# Patient Record
(demographics unavailable — no encounter records)

---

## 2024-11-08 NOTE — HISTORY OF PRESENT ILLNESS
[Normal] : Normal [Yes] : Patient goes to dentist yearly [de-identified] : well balanced and varied

## 2024-11-08 NOTE — BEGINNING OF VISIT
[Mother] : mother [] :  [[employer] ] : provided by [employer]   [Time Spent: ____ minutes] : Total time spent using  services: [unfilled] minutes. The patient's primary language is not English thus required  services. [TWNoteComboBox1] : [place]

## 2024-11-20 NOTE — DISCUSSION/SUMMARY
[FreeTextEntry1] : Referred to the ER - Patient had 1 wet diaper in 8 hours  Given 1 dose of decadron for croup cough and ibuprofen for fever.  93% O2 saturation on room air with increase work of breathing. Concern for pneumonia and dehydration.

## 2025-02-08 NOTE — HISTORY OF PRESENT ILLNESS
Impression: Vitreous degeneration, bilateral: H43.813. Plan: no holes/tears/detachments. RD precautions discussed. Monitor. 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   Febrile (Tmax: 102) starting Sunday, receiving Motrin/Tylenol, last dose was last night, afebrile today  Cough and Congestion starting Sunday. Cough worse in the morning, No increased WOB or SOB.  Face Rash started yesterday. Unable to state if rash hurts or not. Rash improving. Previous additional red rash on her belly button that resolved today.   3 episodes of NBNB emesis Monday, resolved. Eating and drinking at baseline now. Adequate UOP. No diarrhea or dysuria. No headache, changes in gait, or dizziness. No chest pain. No known sick contacts but attends school. Previously in the hospital 1-2 weeks ago for dehydration 2/2 to nausea/vomiting. Returned to baseline upon discharge home prior to this presentation. UTD on vaccinations.

## 2025-02-08 NOTE — HISTORY OF PRESENT ILLNESS
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   Febrile (Tmax: 102) starting Sunday, receiving Motrin/Tylenol, last dose was last night, afebrile today  Cough and Congestion starting Sunday. Cough worse in the morning, No increased WOB or SOB.  Face Rash started yesterday. Unable to state if rash hurts or not. Rash improving. Previous additional red rash on her belly button that resolved today.   3 episodes of NBNB emesis Monday, resolved. Eating and drinking at baseline now. Adequate UOP. No diarrhea or dysuria. No headache, changes in gait, or dizziness. No chest pain. No known sick contacts but attends school. Previously in the hospital 1-2 weeks ago for dehydration 2/2 to nausea/vomiting. Returned to baseline upon discharge home prior to this presentation. UTD on vaccinations.

## 2025-02-08 NOTE — PHYSICAL EXAM
[Alert] : alert [EOMI] : grossly EOMI [Cerumen in canal] : cerumen in canal [Clear] : right tympanic membrane clear [Clear Rhinorrhea] : clear rhinorrhea [Symmetric Chest Wall] : symmetric chest wall [NL] : soft, nontender, nondistended, normal bowel sounds, no hepatosplenomegaly [Moves All Extremities x 4] : moves all extremities x4 [Acute Distress] : no acute distress [Tenderness] : no tenderness [Conjuctival Injection] : no conjunctival injection [Erythema] : no erythema [Bulging] : not bulging [Purulent Effusion] : no purulent effusion [Erythematous Oropharynx] : nonerythematous oropharynx [FreeTextEntry3] : Fluid behind right TM+ [FreeTextEntry4] : Nasal Congestion+ [de-identified] : -Small red blisters surround L side of mouth

## 2025-02-08 NOTE — DISCUSSION/SUMMARY
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   #URI - Cough and congestion x 4d, fever x3d, resolved.  - Hemodynamically stable, Afebrile. PE notable for cough/congestion and fluid behind right TM otherwise reassuring - Less concern for serious bacterial infection, acute respiratory compromise, or dehydration at this time  - Support Care discussed with mom, including continue good PO, adequate hydration, Motrin/Tylenol for fever/aches - Strict Return Precautions discussed with Mom, including concerns for dehydration, increased work of breathing, and lethargy   #Impetigo -Small red blisters surround L side of mouth  -Mupirocin 3x/day to affected area for 5d course - RTC if rash worsens

## 2025-03-06 NOTE — CONSULT LETTER
[Today's Date] : [unfilled] [Name] : Name: [unfilled] [] : : ~~ [Today's Date:] : [unfilled] [Dear  ___:] : Dear Dr. [unfilled]: [Consult] : I had the pleasure of evaluating your patient, [unfilled]. My full evaluation follows. [Consult - Single Provider] : Thank you very much for allowing me to participate in the care of this patient. If you have any questions, please do not hesitate to contact me. [Sincerely,] : Sincerely, [FreeTextEntry4] : Allison Ruby MD [FreeTextEntry5] :  [address], [place] [address] [de-identified] : Yumi Villegas MD Pediatric Cardiologist [facility], [address], [place] [address] Phone: [phone] FAX: [phone]

## 2025-03-06 NOTE — CARDIOLOGY SUMMARY
[Normal] : normal [LVSF ___%] : LV Shortening Fraction [unfilled]% [Today's Date] : [unfilled] [FreeTextEntry1] : The electrocardiogram today revealed a normal sinus rhythm at a rate of 131 bpm, with a normal axis, a right ventricular conduction delay, and normal ventricular forces.  There were nonspecific T wave abnormalities.  No ectopy was seen on the surface EKG. [FreeTextEntry2] : Summary:  1. 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 2.  {S,D,S  } Situs solitus, D-ventricular looping, normally related great arteries. 3. 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 No hemodynamically  significant VSD seen on this echocardiogram. 4. No evidence of an atrial septal defect and intact atrial septum. 5. Prominent Eustachian valve. 6. 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 7. Mildly dilated aortic root. 8. Aortic sinuses of Valsalva dimension (systole) = 2.3 cm (z = 3.30). 9. Trivial aortic valve regurgitation. 10. The aortic root in cross section (PSAX) measures: 2.13 cm X 2.27 cm X 2.32 cm. The aortic root  leaflets are asymmetric, with a prominent posterior, non-coronary cusp. The sino-tubular junction is effaced. The ascending aorta in cross section (PSAX) at the level of the right pulmonary artery measures: 1.8  cm. The proximal abdominal aorta and the thoracic descending aorta appear normal in caliber and uniform in contour. A ductal ampulla is noted on the proximal descending aorta. 11. No patent ductus arteriosus. 12. Normal ascending aorta. 13. Ascending aorta dimension (systole) = 1.8 cm (z = 1.74). 14. Normal main pulmonary artery confluent with the right and left branch pulmonary arteries. 15. Normal systolic configuration of interventricular septum. 16. There is a high take-off of the right coronary artery (at the sino-tubular junction). It likely arises from the appropriate right sinus of Valsalva; however, imaging of the coronary arteries was technically limited on this study. 17. There is a prominent sub-aortic ventricular septal segment, with no LV outflow obstruction.  18. Normal left ventricular size, morphology and systolic function. 19. Left ventricular ejection fraction by 5/6 Area x Length is normal at 66 %. 20. Normal left ventricular diastolic function. 21. Normal right ventricular morphology with qualitatively normal size and systolic function. 22. No pericardial effusion. [de-identified] : June 18, 2024 [de-identified] : This 24-hour Holter monitor showed a predominant normal sinus rhythm with normal heart rate variation.  The heart rate ranged from 65 to 170 bpm, with an average heart rate of 106 bpm.  There were no long pauses.  Rare premature atrial contractions were seen.  No ventricular ectopy was noted.   [de-identified] : June 18, 2021 [de-identified] : Congenital Disorders of Glycosylation (CDG) genetic panel:  \par  A pathogenic variant and a likely pathogenic variant in gene ALG12. Changes in this gene are associated with autosomal recessive CDG-type 1g.

## 2025-03-06 NOTE — CONSULT LETTER
[Today's Date] : [unfilled] [Name] : Name: [unfilled] [] : : ~~ [Today's Date:] : [unfilled] [Dear  ___:] : Dear Dr. [unfilled]: [Consult] : I had the pleasure of evaluating your patient, [unfilled]. My full evaluation follows. [Consult - Single Provider] : Thank you very much for allowing me to participate in the care of this patient. If you have any questions, please do not hesitate to contact me. [Sincerely,] : Sincerely, [FreeTextEntry4] : Allison Ruby MD [FreeTextEntry5] :  [address], [place] [address] [de-identified] : Yumi Villegas MD Pediatric Cardiologist [facility], [address], [place] [address] Phone: [phone] FAX: [phone]

## 2025-03-06 NOTE — PHYSICAL EXAM
[General Appearance - Alert] : alert [General Appearance - In No Acute Distress] : in no acute distress [General Appearance - Well-Appearing] : well appearing [Cooperative] : cooperative [Sclera] : the conjunctiva were normal [Examination Of The Oral Cavity] : mucous membranes were moist and pink [Respiration, Rhythm And Depth] : normal respiratory rhythm and effort [Auscultation Breath Sounds / Voice Sounds] : breath sounds clear to auscultation bilaterally [No Cough] : no cough [Normal Chest Appearance] : the chest was normal in appearance [Heart Rate And Rhythm] : normal heart rate and rhythm [Heart Sounds] : normal S1 and S2 [No Murmur] : no murmurs  [Arterial Pulses] : normal upper and lower extremity pulses with no pulse delay [Edema] : no edema [Capillary Refill Test] : normal capillary refill [No Diastolic Murmur] : no diastolic murmur was heard [Abdomen Soft] : soft [Nail Clubbing] : no clubbing  or cyanosis of the fingernails [] : no rash [Normal] : no abnormalities [FreeTextEntry1] :  CALS (~3x1cm) -midline abdomen, 1 sacral Welsh patch.

## 2025-03-06 NOTE — CARDIOLOGY SUMMARY
[Normal] : normal [LVSF ___%] : LV Shortening Fraction [unfilled]% [Today's Date] : [unfilled] [FreeTextEntry1] : The electrocardiogram today revealed a normal sinus rhythm at a rate of 131 bpm, with a normal axis, a right ventricular conduction delay, and normal ventricular forces.  There were nonspecific T wave abnormalities.  No ectopy was seen on the surface EKG. [FreeTextEntry2] : Summary:  1. 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 2.  {S,D,S  } Situs solitus, D-ventricular looping, normally related great arteries. 3. 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 No hemodynamically  significant VSD seen on this echocardiogram. 4. No evidence of an atrial septal defect and intact atrial septum. 5. Prominent Eustachian valve. 6. 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 7. Mildly dilated aortic root. 8. Aortic sinuses of Valsalva dimension (systole) = 2.3 cm (z = 3.30). 9. Trivial aortic valve regurgitation. 10. The aortic root in cross section (PSAX) measures: 2.13 cm X 2.27 cm X 2.32 cm. The aortic root  leaflets are asymmetric, with a prominent posterior, non-coronary cusp. The sino-tubular junction is effaced. The ascending aorta in cross section (PSAX) at the level of the right pulmonary artery measures: 1.8  cm. The proximal abdominal aorta and the thoracic descending aorta appear normal in caliber and uniform in contour. A ductal ampulla is noted on the proximal descending aorta. 11. No patent ductus arteriosus. 12. Normal ascending aorta. 13. Ascending aorta dimension (systole) = 1.8 cm (z = 1.74). 14. Normal main pulmonary artery confluent with the right and left branch pulmonary arteries. 15. Normal systolic configuration of interventricular septum. 16. There is a high take-off of the right coronary artery (at the sino-tubular junction). It likely arises from the appropriate right sinus of Valsalva; however, imaging of the coronary arteries was technically limited on this study. 17. There is a prominent sub-aortic ventricular septal segment, with no LV outflow obstruction.  18. Normal left ventricular size, morphology and systolic function. 19. Left ventricular ejection fraction by 5/6 Area x Length is normal at 66 %. 20. Normal left ventricular diastolic function. 21. Normal right ventricular morphology with qualitatively normal size and systolic function. 22. No pericardial effusion. [de-identified] : June 18, 2024 [de-identified] : This 24-hour Holter monitor showed a predominant normal sinus rhythm with normal heart rate variation.  The heart rate ranged from 65 to 170 bpm, with an average heart rate of 106 bpm.  There were no long pauses.  Rare premature atrial contractions were seen.  No ventricular ectopy was noted.   [de-identified] : June 18, 2021 [de-identified] : Congenital Disorders of Glycosylation (CDG) genetic panel:  \par  A pathogenic variant and a likely pathogenic variant in gene ALG12. Changes in this gene are associated with autosomal recessive CDG-type 1g.

## 2025-03-06 NOTE — DISCUSSION/SUMMARY
[May participate in all age-appropriate activities] : [unfilled] May participate in all age-appropriate activities. [Influenza vaccine is recommended] : Influenza vaccine is recommended [FreeTextEntry1] : Lori's cardiac evaluation was notable for a variation on coronary artery anatomy which shows a high takeoff of the right coronary artery from the appropriate right sinus of Valsalva.  Her aortic root and sinotubular junction have been mildly dilated, but stable (see table below).  On today's echocardiogram, her aortic root measured ~ 2.3 cm, consistent with a Z score of ~ 3.3 (a previous aortic root Z score on 2022, was 3.38). Her ascending aortic dimension was WNL.  In the past, she had a perimembranous ventricular septal defect, which has essentially closed by aneurysm formation.  No hemodynamically significant VSD was seen on today's echocardiogram. She has a prominent sub-aortic ventricular segment with no evidence of LV outflow obstruction. She has no clinical or echo evidence of a coarctation of the aorta. She does not have a patent ductus arteriosus. She was in a normal sinus rhythm on her electrocardiogram, with no ectopy.  In , Lori was noted to have frequent, unifocal premature ventricular contractions.  A 24-hour Holter monitor performed 2024, showed no ventricular ectopy.  She shows no signs or symptoms of congestive heart failure.  Her weight gain has been progressive and adequate. In the past, she has been treated for iron deficiency anemia. This should be followed in your pediatric office.  Aortic root dimensions/Z-scores: Tracking her aortic dimensions since  aortic root Z scores have ranged from 1.94 to 3.38.  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  She is a former 34+week premie  female with medical history of congenital heart disease, microcephaly and dysmorphic features (bitemporal narrowing, plagiocephaly, unusual facies, upslanting palpebrae with dystopia canthorum, anteverted nares, mild retrognathia, 5th finger clinodactyly, skin dimples at elbows and knees). Her prenatal genetic testing for thickened nuchal fold included prenatal chromosome and microarray analysis and [place] gene panel were unrevealing. 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 More recently, a genetic Congenital Disorders of Glycosylation panel done on Lori revealed a pathogenic variant, and a likely pathogenic variant in ALG12. Changes in this gene are associated with an autosomal recessive a congenital disorders of glycosylation (CDG) - type 1g. Congenital disorders of glycosylation are rare. This is an N-linked glycosylation abnormality and likely explains Lori's multiple syndromic features and clinical problems.  Of note, her brother Nikita also had the same mutation. Nikita had multiple congenital anomalies and was also followed in pediatric cardiology for a dilated aorta and a "mature" appearing subaortic ventricular septum.  Unfortunately, Nikita  in 2023 from complications related to his multi-organ disease.  In light of Lori's mildly dilated aortic root, it would be important to continue to follow her expectantly in pediatric cardiology. She should be seen in follow up in pediatric cardiology in ~8 months time. The above information was explained at length to Mrs. Atwood and all of her questions were answered.    Total time spent today included reviewing diagnosis and treatment plan/monitoring, review of prior notes, review of medications and monitoring for side effects, review of last labs with patient/family, plan for continued symptom and laboratory monitoring as ordered, and time spent with patient/parent. Reviewed recent chart notes from other providers and medical records as well. This excludes time spent on procedures. [Needs SBE Prophylaxis] : [unfilled] does not need bacterial endocarditis prophylaxis

## 2025-03-06 NOTE — PHYSICAL EXAM
[General Appearance - Alert] : alert [General Appearance - In No Acute Distress] : in no acute distress [General Appearance - Well-Appearing] : well appearing [Cooperative] : cooperative [Sclera] : the conjunctiva were normal [Examination Of The Oral Cavity] : mucous membranes were moist and pink [Respiration, Rhythm And Depth] : normal respiratory rhythm and effort [Auscultation Breath Sounds / Voice Sounds] : breath sounds clear to auscultation bilaterally [No Cough] : no cough [Normal Chest Appearance] : the chest was normal in appearance [Heart Rate And Rhythm] : normal heart rate and rhythm [Heart Sounds] : normal S1 and S2 [No Murmur] : no murmurs  [Arterial Pulses] : normal upper and lower extremity pulses with no pulse delay [Edema] : no edema [Capillary Refill Test] : normal capillary refill [No Diastolic Murmur] : no diastolic murmur was heard [Abdomen Soft] : soft [Nail Clubbing] : no clubbing  or cyanosis of the fingernails [] : no rash [Normal] : no abnormalities [FreeTextEntry1] :  CALS (~3x1cm) -midline abdomen, 1 sacral Ukrainian patch.

## 2025-03-06 NOTE — HISTORY OF PRESENT ILLNESS
[FreeTextEntry1] : Lori was evaluated at the cardiology office at the [facility] on 2025.  She is now a 6-arve-2jydjh-old child who is followed in our division for a perimembranous VSD which had closed by aneurysm formation, and a variant of coronary artery anatomy (high takeoff of the right coronary artery above the appropriate right sinus of Valsalva).   She also has a mildly dilated aortic root, with no discrete coarctation of the aorta. Lori has a congenital disorder of glycosylation associated with a mutation in the ALG12 gene. Her last evaluation in pediatric cardiology was on 2024.  Also, in 2024, Lori was noted to have frequent, unifocal premature ventricular contractions. A follow-up Holter monitor in 2024 showed no ventricular ectopy.  On May 12, 2021, Lori was seen in Marfan/connective tissue disorder clinic for a full evaluation, because of her dilated aortic root dimension and arachnodactyly. At that time, Lori was evaluated by Dr. Cho of genetics and ha ready to go any d extensive genetic testing performed.  She had normal chromosomes and a normal microarray.  However, her lactate was slightly elevated and the results of the carbohydrate deficient transferrin was positive, suggesting a diagnosis of one of the CDG type 1 disorders. A genetic Congenital Disorders of Glycosylation panel was done on Lori and revealed a pathogenic variant, and a likely pathogenic variant in ALG12. Changes in this gene are associated with an autosomal recessive a congenital disorders of glycosylation (CDG) - type 1g.  This is an N-linked glycosylation abnormality, and likely explains Lori's multiple syndromic features and clinical problems.  Of note, her brother Nikita also had the same mutation, and unfortunately  in 2023 from complications related to his multi-organ disease.  She was accompanied to the office visit today by her mother.   Birth history: She was the 2993 g product of a 34-week gestation.  Maternal history was positive for gestational diabetes on insulin, and hypothyroidism.  Lori's mother was referred at 22 weeks gestation for a fetal echocardiogram because of the concern of a thick nuchal fold in the fetus.  The initial study was difficult due to limited acoustical windows and she returned at 26 weeks gestation for a follow-up fetal echocardiogram.  This study was notable for a tortuous ductus arteriosus and an atypical appearing fetal aortic arch.   cardiology follow-up was recommended.  Lori was a LGA 34-week infant who required oxygen and CPAP at birth.  Her chest x-ray was consistent with respiratory distress syndrome and she had apneic episodes in the early  period, which resolved.  She was weaned off respiratory support by day 2 of life.  Her initial cardiac evaluation on day 1 of life was notable for a patent foramen ovale and a large tortuous ductus arteriosus.  There was no obvious coarctation of the aorta.  A follow-up echocardiogram was performed on day 3 of life.  This study showed a smaller ductus arteriosus and an atypical aortic arch with no discrete coarctation of the aorta.  The infant was discharged home on 2020.  Her discharge weight was 2876 g.  Lori was a dysmorphic infant with microcephaly.  Lori's mother had amniocentesis due to prenatal ultrasound showing thickened nuchal fold. Chromosomal analysis and microarray were negative. [place] syndrome panel found a maternally inherited variant of uncertain significance VUS in PTPN11 (c.1697 C>T) and a VUS in PPP1CB (c.968 C>T). The PPP1CB was not maternally inherited, but paternal samples were not available to determine if it was paternally inherited.  She was seen in genetics on January 15, 2021.  The  stated that they would try to get complementary whole genome sequencing performed on Lori. She was referred to the [facility] program at [facility] for free whole genome sequencing but she was not accepted into this study.     PRESENT HISTORY: Lori's mother states that Lori has been overall doing well.  She manifests no signs or symptoms of respiratory distress, cyanosis, easy fatigability, or shortness of breath.  She is on NO medications, other than vitamins.  Her growth has been steadily progressive and appropriate.  She is a finicky eater and a slow eater. She was last seen in pediatric GI on 2024.  Feeding therapy was recommended.  She is currently potty trained.  Her immunizations are up-to-date.  In the past, she has been evaluated by Dr. Elkins of neurosurgery for her microcephaly/plagiocephaly.  Lori's mother tells me that follow-up with neurosurgery is no longer necessary. She was evaluated in developmental pediatrics on 2021. She attends [address] [place] [address]- and she receives OT, PT and speech therapy in school. She also receives special education services. Also, she has markedly limited speech with only single words.  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 Moran.  She continues to follow in ENT. Her last visit was on 2024. On 2022, Lori was evaluated by Dr. Eli of general surgery for serosanguineous drainage from her umbilicus. This has resolved.   Lori was seen in Peds Ortho on 11/15/23. She is being followed for a leg length discrepancy. Lori was seen in Peds Ophth on 21. She was noted to have possible amblyopia OU. She had a fully dilated eye exam which was entirely normal with no evidence of ectopia lentis.  Annual follow-up is recommended. Her last dental evaluation was in 2024.  A review of systems was otherwise unremarkable.   There is no family history of open heart surgery, sudden unexplained death or arrhythmias. Of note, Lori's older brother, Nikita, had been followed in pediatric cardiology with a diagnosis of a dilated aortic root and ascending aorta and a dilated main pulmonary artery with a minimally dysplastic pulmonary valve. He also had a prominent subaortic ventricular septum with no left ventricular outflow obstruction. Nikita had multiple congenital anomalies including spina bifida, hydrocephalus with a  shunt, intellectual disabilities with global delays, hearing impairments (moderate to severe sensorineural hearing loss), seizure disorder, iron deficiency anemia and chronic recurrent pancreatitis. He had been evaluated by genetics and had whole exome sequencing (through a [facility] research program), which was uninformative. He also had negative TAAD and SDS gene testing.  However, on review of a genetics note from Zeynep Sheets, [facility] genetics counselor, it was noted that Lori's brother, Nikita, had additional genetic testing (at [facility]) which came back positive for variants in ALG12. Of note, her brother Nikita who also had the same mutation as Lori, unfortunately  in 2023, at approximately 10 years of age, from complications related to his multi-organ disease.

## 2025-03-10 NOTE — DISCUSSION/SUMMARY
[FreeTextEntry1] : herpes gingivostomatitis healing lesions . Mom applying mupirocin to area and according to mom drying up. Gums also slightly inflamed. Advised Call back in a few days to assess rash and if much better can then return to school mother expressed understanding

## 2025-03-10 NOTE — HISTORY OF PRESENT ILLNESS
[de-identified] : rash [FreeTextEntry6] : had fever for three days and as of Sunday no more fever. But has had a rash around mouth papular and patchy red and on chin and lips gums hyperemic This visit was provided via telehealth using real time 2 way audio visual technology. The patient was located at home [address] [place] [address] at the time of the visit. The provider Dr Inder Arnold was located at the medical office [address] [place] [address] at the tiem of the visit. The patient her mother an dprovider participated in the telehealth encounter. Verbal consent for telehealth services was given on March 10 2025 by the mother.

## 2025-03-18 NOTE — HISTORY OF PRESENT ILLNESS
[de-identified] : fever [FreeTextEntry6] : fever x 2 days cough po ok + uri symptoms uo ok no rash happy and playful

## 2025-03-18 NOTE — BEGINNING OF VISIT
[] :  [Language Line ] : provided by [employer]   [Time Spent: ____ minutes] : Total time spent using  services: [unfilled] minutes. The patient's primary language is not English thus required  services. [TWNoteComboBox1] : Iranian

## 2025-03-18 NOTE — DISCUSSION/SUMMARY
[FreeTextEntry1] : Fever likely viral supportive care encourage PO seek MD with new or worsening symptoms URI no inc work of breathing supportive care encourage hydration nasal saline humidified steam no OTC cough or cold medicine monitor for resp distress seek MD with new or worsening symptoms

## 2025-03-25 NOTE — REASON FOR VISIT
[Initial Consultation] : an initial consultation for [Cough] : cough [Parents] : parents [Medical Records] : medical records

## 2025-03-26 NOTE — DISCUSSION/SUMMARY
[FreeTextEntry1] : Cough benign exam today given albuterol 2 puffs via MDI for educational use- as rec by pulm use and side effects discussed  Cough no increased work of breathing likely viral supportive care- nasal saline, suction, humidified air monitor for resp distress no OTC cough medications

## 2025-03-26 NOTE — HISTORY OF PRESENT ILLNESS
[de-identified] : cough [FreeTextEntry6] : fever x 2 days cough always coughing a lot seen by pulm yesterday- clear lungs at that visit was started on fluticasone nasal srpay- but pharmacy did not hjave also given alb- but did not get from pharmacy yet no SOB po decreased but drinking

## 2025-03-26 NOTE — BEGINNING OF VISIT
[Mother] : mother [] :  [Language Line ] : provided by [employer]   [Time Spent: ____ minutes] : Total time spent using  services: [unfilled] minutes. The patient's primary language is not English thus required  services. [TWNoteComboBox1] : Ukrainian

## 2025-03-28 NOTE — BIRTH HISTORY
[At ___ Weeks Gestation] : at [unfilled] weeks gestation [Physical Therapy] : physical therapy [Occupational Therapy] : occupational therapy [Speech Therapy] : speech therapy [Age Appropriate] : age appropriate developmental milestones not met

## 2025-03-28 NOTE — ASSESSMENT
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 Her chronic dry cough is likely multifactorial, with aspiration being a primary contributor, as symptoms worsen with feeding. A modified barium swallow study (MBSS) is recommended to evaluate for aspiration and assess swallowing mechanics. Additionally, her symptoms may be exacerbated by tracheomalacia, potentially due to external compression from her dilated aortic root, leading to airway collapse and impaired clearance of secretions. Imaging reviewed shows evidence of chronic lung disease, supporting the need for optimized airway clearance. Initiation of albuterol and manual chest physiotherapy (CPT) is recommended twice daily when well and every 4-6 hours during illness to facilitate secretion clearance and reduce airway reactivity. A trial of inhaled corticosteroids (ICS) is also advised to address any underlying airway inflammation contributing to her persistent symptoms. Her symptoms are confounded by rhinitis and an intranasal steroid and/or an antihistamine with Zyrtec/Claritin and Flonase may be helpful in controlling symptoms associated with a post-nasal drip and upper airway cough syndrome. Additionally patient has sleep disordered breathing, will continue to monitor for now and consider obtaining sleep study if symptoms worsen. History, exam, trajectory or course not consistent at this time with alternative diagnoses such as CF, PCD, immune deficiency.   Discussed above assessment, management plan, potential medication side effects and test results. Parent agreed with plan. All queries were answered. Patients evaluation include normal saturation and lung exam. Time excludes separately reported services.  PLAN:  1. Airway clearance: When Well: Albuterol-> manual chest PT twice daily, Fluticasone Propionate 44mcg 2 puffs twice daily (always use spacer with asthma pumps and rinse mouth after use)  When Sick: Albuterol-> manual chest PT every 4-6 hrs daily, Fluticasone Propionate 44mcg 2 puffs twice daily (always use spacer with asthma pumps and rinse mouth after use)  2. Rhinitis: Flonase 1 spray to each nostril as needed once daily 3. MBSS to evaluate for aspiration and safety of oral feeds.

## 2025-03-28 NOTE — END OF VISIT
[] : Fellow [FreeTextEntry3] : All the information documented by staff members, review of systems, past medical history, family history, social history, surgical history, medications, allergies, immunizations and vital signs were reviewed. I also reviewed the chart for lab results, radiological images and procedures. I was present with the Fellow during the key portions of the history and exam. I personally saw and examined the patient, talked with the family, and discussed the patient with the fellow. I also discussed the assessment and plan with the family. I modified the fellows note as needed. I agree with the fellows history, physical exam, assessment and plan. I have spent 60 minutes of time on the encounter which excludes teaching and separately reported services.    Aaron Ortiz MD  Pulmonary & Sleep Medicine

## 2025-03-28 NOTE — HISTORY OF PRESENT ILLNESS
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  INITIAL RESPIRATORY HISTORY 2025 - Respiratory symptoms when well: chronic dry cough - Bacterial infections/Antibiotics: - Hospitalizations/ER visits: hospitalized -1/15 for dehydration i/s/o URI - Albuterol use: frequent - Steroids use: denies - Reflux/Aspiration symptoms: denies - Vaccination status: UTD - Sleep symptoms: Snores   - Gestational age 34 weeks - Development: delayed - Feeding regimen: PO feeds, mom endorses worsening of cough with food - Medications taking: Albuterol as needed   - [facility] asthma: denies - Allergy symptoms (chronic nasal congestion): + - Eczema: +  ENT: h/o Neck cyst, established with ENT , s/p cyst drainage inside left side of neck-pyriforme sinus. Repeat MDLB showed no pus  CARDS:  variation on coronary artery anatomy which shows a high takeoff of the right coronary artery from the appropriate right sinus of Valsalva. Her aortic root and sinotubular junction have been mildly dilated IMMUNOLOGY: GI: slow wt gain and constipation, followed by GI,  Clinical swallow evaluation 2024 given history of pocketing of food and prolonged mealtimes. Oral stage deficits of solids marked by emerging mastication pattern, overstuffing and prolonged chewing. Functional oral stage of liquids noted. No overt signs or symptoms of aspiration were demonstrated for thin liquids Feeding therapy recommended  Born 34 weeks, Pregnancy c/b GDM and maternal hypothyroidism. LGA oxygen and CPAP at birth Her chest x-ray was consistent with respiratory distress syndrome and she had apneic episodes in the early  period, which resolved. She was weaned off respiratory support by day 2 of life

## 2025-03-28 NOTE — REVIEW OF SYSTEMS
[NI] : Genitourinary  [Nl] : Endocrine [Frequent URIs] : frequent upper respiratory infections [Snoring] : snoring [Cough] : cough [Eczema] : eczema [Apnea] : no apnea [Postnasl Drip] : no postnasal drip [Wheezing] : no wheezing [Sleep Disturbances] : ~T no sleep disturbances

## 2025-03-28 NOTE — DATA REVIEWED
[FreeTextEntry1] :   I personally reviewed records, documentation and images (findings included into my note), including: -ECHO 3/4/25 - CXR:             -  11/2/20             -   11/26/23             -    11/20/24 CT neck: 2/21/22  RVP :  3/14/25: + rhino/entero virus 10/30/24:  + rhino/entero virus

## 2025-03-28 NOTE — HISTORY OF PRESENT ILLNESS
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  INITIAL RESPIRATORY HISTORY 2025 - Respiratory symptoms when well: chronic dry cough - Bacterial infections/Antibiotics: - Hospitalizations/ER visits: hospitalized -1/15 for dehydration i/s/o URI - Albuterol use: frequent - Steroids use: denies - Reflux/Aspiration symptoms: denies - Vaccination status: UTD - Sleep symptoms: Snores   - Gestational age 34 weeks - Development: delayed - Feeding regimen: PO feeds, mom endorses worsening of cough with food - Medications taking: Albuterol as needed   - [facility] asthma: denies - Allergy symptoms (chronic nasal congestion): + - Eczema: +  ENT: h/o Neck cyst, established with ENT , s/p cyst drainage inside left side of neck-pyriforme sinus. Repeat MDLB showed no pus  CARDS:  variation on coronary artery anatomy which shows a high takeoff of the right coronary artery from the appropriate right sinus of Valsalva. Her aortic root and sinotubular junction have been mildly dilated IMMUNOLOGY: GI: slow wt gain and constipation, followed by GI,  Clinical swallow evaluation 2024 given history of pocketing of food and prolonged mealtimes. Oral stage deficits of solids marked by emerging mastication pattern, overstuffing and prolonged chewing. Functional oral stage of liquids noted. No overt signs or symptoms of aspiration were demonstrated for thin liquids Feeding therapy recommended  Born 34 weeks, Pregnancy c/b GDM and maternal hypothyroidism. LGA oxygen and CPAP at birth Her chest x-ray was consistent with respiratory distress syndrome and she had apneic episodes in the early  period, which resolved. She was weaned off respiratory support by day 2 of life

## 2025-03-28 NOTE — HISTORY OF PRESENT ILLNESS
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  INITIAL RESPIRATORY HISTORY 2025 - Respiratory symptoms when well: chronic dry cough - Bacterial infections/Antibiotics: - Hospitalizations/ER visits: hospitalized -1/15 for dehydration i/s/o URI - Albuterol use: frequent - Steroids use: denies - Reflux/Aspiration symptoms: denies - Vaccination status: UTD - Sleep symptoms: Snores   - Gestational age 34 weeks - Development: delayed - Feeding regimen: PO feeds, mom endorses worsening of cough with food - Medications taking: Albuterol as needed   - [facility] asthma: denies - Allergy symptoms (chronic nasal congestion): + - Eczema: +  ENT: h/o Neck cyst, established with ENT , s/p cyst drainage inside left side of neck-pyriforme sinus. Repeat MDLB showed no pus  CARDS:  variation on coronary artery anatomy which shows a high takeoff of the right coronary artery from the appropriate right sinus of Valsalva. Her aortic root and sinotubular junction have been mildly dilated IMMUNOLOGY: GI: slow wt gain and constipation, followed by GI,  Clinical swallow evaluation 2024 given history of pocketing of food and prolonged mealtimes. Oral stage deficits of solids marked by emerging mastication pattern, overstuffing and prolonged chewing. Functional oral stage of liquids noted. No overt signs or symptoms of aspiration were demonstrated for thin liquids Feeding therapy recommended  Born 34 weeks, Pregnancy c/b GDM and maternal hypothyroidism. LGA oxygen and CPAP at birth Her chest x-ray was consistent with respiratory distress syndrome and she had apneic episodes in the early  period, which resolved. She was weaned off respiratory support by day 2 of life

## 2025-03-28 NOTE — HISTORY OF PRESENT ILLNESS
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  INITIAL RESPIRATORY HISTORY 2025 - Respiratory symptoms when well: chronic dry cough - Bacterial infections/Antibiotics: - Hospitalizations/ER visits: hospitalized -1/15 for dehydration i/s/o URI - Albuterol use: frequent - Steroids use: denies - Reflux/Aspiration symptoms: denies - Vaccination status: UTD - Sleep symptoms: Snores   - Gestational age 34 weeks - Development: delayed - Feeding regimen: PO feeds, mom endorses worsening of cough with food - Medications taking: Albuterol as needed   - [facility] asthma: denies - Allergy symptoms (chronic nasal congestion): + - Eczema: +  ENT: h/o Neck cyst, established with ENT , s/p cyst drainage inside left side of neck-pyriforme sinus. Repeat MDLB showed no pus  CARDS:  variation on coronary artery anatomy which shows a high takeoff of the right coronary artery from the appropriate right sinus of Valsalva. Her aortic root and sinotubular junction have been mildly dilated IMMUNOLOGY: GI: slow wt gain and constipation, followed by GI,  Clinical swallow evaluation 2024 given history of pocketing of food and prolonged mealtimes. Oral stage deficits of solids marked by emerging mastication pattern, overstuffing and prolonged chewing. Functional oral stage of liquids noted. No overt signs or symptoms of aspiration were demonstrated for thin liquids Feeding therapy recommended  Born 34 weeks, Pregnancy c/b GDM and maternal hypothyroidism. LGA oxygen and CPAP at birth Her chest x-ray was consistent with respiratory distress syndrome and she had apneic episodes in the early  period, which resolved. She was weaned off respiratory support by day 2 of life

## 2025-04-03 NOTE — ASSESSMENT
[FreeTextEntry1] : PEDIATRIC CLINICAL SWALLOW EVALUATION  Type of Evaluation & Procedure Code: Evaluation of Oral and Pharyngeal Swallow CPT 05907   Patient Name: Lroi Díaz  YOB: 2020  Date of Evaluation: 4.1.2025   Referrig Physician: Dr. Aaron Ortiz   Referring Physician Specialty: Pulmonologist   Medical Diagnosis: Failure to thrive in pediatric patient R62.51  Treatment Diagnosis: Oropharyngeal Dysphagia (R13.12)  Type of Evaluation & Procedure Code: Evaluation of Oral and Pharyngeal Swallow CPT 62217   REASON FOR REFERRAL: Lori Díaz, a 4 year old female was referred for a Clinical Swallow Evaluation to assess oral and pharyngeal swallow function as parent reports feeding difficulties including reported decreased oral intake and chronic coughing (~6-8 months ago onset) which worsens with feeding prior to MBS scheduled 4/22/2025. Patient was accompanied by parent who provided the case history information. Mother required multiple probing questions to obtain history.   PRIMARY LANGUAGE:   Reported Primary Language: [place]ankan. Evaluation conducted in [place]an.   BIRTH & MEDICAL HISTORY: Birth and medical history was gathered via caregiver interview and review of electronic medical record. Patient born 34 weeks and required oxygen and CPAP at birth due to respiratory distress syndrome and apneic episodes, which resolved. She was weaned off respiratory support by day 2 of life. Her initial cardiac evaluation on day 1 of life was notable for a patent foramen ovale and a large tortuous ductus arteriosus. Patient was discharged on November 12, 2020. Patient is s/p cyst drainage inside left side of neck-pyriform sinus. Patient is s/p Exam of ears under anesthesia with left myringotomy, right wax removal, left pyriform sinus tract cautery, and microdirect laryngoscopy with bronchoscopy 10/13/22. Patient's medical history significant for microcephaly, slow weight gain, developmental delay, significant language delay, mild hearing loss. 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 Patient is followed by Cardiology, Genetics, ENT.   Per most recent pulmonology noted 3/25/2025 "Her chronic dry cough is likely multifactorial, with aspiration being a primary contributor, as symptoms worsen with feeding. A modified barium swallow study (MBSS) is recommended to evaluate for aspiration and assess swallowing mechanics."  Hospitalizations: Hospitalized 1/13/25 -1/15/25 for dehydration, coughing, and vomiting secondary to upper respiratory infection at [facility]. Surgeries: February removal of cyst Emergency Room: Patient with multiple emergency room visits to [facility] with last visit March 28th, 2025 for reported fever please see sunrise chart for full details.   Medical Allergies: None reported  Food Allergies: None reported  Current respiratory status: room air  Food intolerances: none reported    Results of Previous Clinical Swallow Evaluations: 6/12/2024 "IMPRESSIONS: Lori Díaz, a 3 year old female was referred by Pediatrician, Dr. Cohn for a Clinical Swallow Evaluation to assess oral and pharyngeal swallow function as patient's school reports patient will pocket food and have prolonged mealtimes. Patient presents with mild oral dysphagia. Oral stage deficits of solids marked by emerging mastication pattern, overstuffing and prolonged chewing. Functional oral stage of liquids. Pharyngeal phase is characterized by timely pharyngeal swallow initiation and motor response appreciated with adequate hyolaryngeal elevation/excursion via digital palpation. No overt signs or symptoms of aspiration were demonstrated for solids or Thin Liquids. Recommend continuing feeding therapy through current based services to address oral stage skills and increase dietary repertoire. Continue age appropriate solids (IDDSI Level 7) and Thin Liquids (IDDSI Level 0)."    Results of Most Recent Modified Barium Swallow Study (MBSS)/Videofluoroscopic Swallow Studies (VFSS):None reported   DEVELOPMENTAL MILESTONES: Parent reports delayed milestones. Patient receives speech therapy, occupational therapy, physical therapy through school based services.   FEEDING HISTORY:   Current Diet (based on the International Dysphagia Diet Standardization initiative [IDDSI]):  Solids: Age appropriate solids (IDDSI Level 7) Fluids: Thin Liquids (IDDSI Level 0) via straw/open cup  -Parent reports selective eating at home. Patient will only eat eggs, cheese, tortillas, turkey, pupusas, beans with cheese, cookies,and cereal.  With solids, prolonged chewing is reported at home and patient will chew primarily on her right side.    Feeding Method: Some assistance in feeding.  Feeding schedule: 3 meals per day and snacks with 2-3x per day [employer] protein plus formula   Temperature preference: No preference reported  Length of time taken to complete a feeding: Over 30 minutes, upon probing mother states length of feeding is due to distractions and prolonged mastication.  History of feeding tube: none reported   ASSESSMENT  Mental status: alert and responsive  Head Control: WFL  Trunk Control: WFL  Involuntary movements: not observed   ORAL MOTOR ASSESSMENT:  A cursory oral mechanism examination of was conducted. A cursory oral mechanism examination was limited due to reduced participation. Patient presented with dysmporphic facies as marked by a small head, widely set eyes and small jaw. Patient noted with overall mildly reduced tone and strength in her oral mechanism. Patient presented with facial symmetry and closed mouth posture while at rest.   Dentition: Within functional limits for age  Oral Mucosa: Moist  At this time, clinician did not elicit gag reflex.   ASSESSMENT:   Testing position: upright in kid's sized chair in the [facility] therapy suite.  Feeder: Patient  Vocal Quality was perceptually judged to be within functional limits based on spontaneous vocalizations.  Current Respiratory Status: Room air   Secretion Management: Adequate  Behavioral Observations: Alert and cooperative   Feeding Assessment:   Consistencies Administered:  -Age appropriate solids (IDDSI Level 7)  -Feeding method: Some assistance with feeding  -Endurance during trials: Good  -Patient required minimal encouragement/praise to complete testing/evaluation.  -Behavioral Observations: Alert & cooperative  -There was no coughing or wet vocal quality prior to PO administration.   Regular Solids Level 7 IDDSI:  Oral Preparatory Phase: Patient with adequate labial seal with no anterior loss. Patient demonstrated emerging mastication pattern, marked by vertical and rotary chew pattern. Patient noted to prefer taking bites on her right side with prolonged chewing observed. Parent endorses this is typical for mealtime at home. Patient with reduced bolus cohesion for regular solids. However, given verbal and tactile cues to cheeks to lateralize bolus, utilize double swallows and alternate solids/liquids for safe manipulation of solid patient with improved bolus cohesion.   Oral Phase: Mildly delayed AP transfer with delayed oral transit time. Buccal and lingual residue noted, residue cleared with implementation of safe swallow strategies from SLP (double swallow, liquid wash).   Pharyngeal Phase: Timely pharyngeal swallow initiation and motor response appreciated with adequate hyolaryngeal elevation/excursion via digital palpation. No overt signs of aspiration/penetration observed. Patient noted to have mildly increased breathing. Patient's vocal quality clear pre- and post- trials.   Liquid Consistencies Administered:   -Thin Liquids (IDDSI Level 0) via straw and open cup  -Feeding method: Self-fed  -Endurance during trials: Good  -Patient required minimal encouragement/praise to complete testing/evaluation.  -Behavioral Observations: Alert & cooperative  -There was no coughing or wet vocal quality prior to PO administration    Thin Liquids Level 0 IDDSI:   Oral Preparatory Phase: Patient demonstrated age appropriate straw drinking skills marked by adequate ability to create and maintain intraoral gradient pressure for fluid expression upward in straw. Patient with adequate acceptance and adequate labial seal. For open cup drinking, patient noted with age appropriate cup drinking skills marked by adequate oral grading and jaw stability. Cohesive bolus formation for thin liquids observed.   Oral Phase: Timely anterior posterior movement of bolus with timely oral transit time and adequate clearance of bolus from oral cavity for Thin Liquids.   Pharyngeal Phase: Timely pharyngeal swallow initiation and motor response appreciated with adequate hyolaryngeal elevation/excursion via digital palpation. No overt signs of symptoms of penetration/aspiration noted for Thin Liquids. Patient noted to have mildly increased breathing.  PROGNOSIS:   Prognosis is good for safe and adequate oral intake of the recommended consistencies as outlined below, based on results of today's assessment and medical history reported.   Prognosis is good with therapeutic intervention, parent involvement, caregiver involvement, patient involvement.   EDUCATION:  Discussed results of evaluation with patient's caregivers  Patient's caregiver expressed understanding of evaluation and agreement with goals and treatment plan  Patient's caregiver expressed understanding of safety precautions/feeding recommendations  Patient's caregivers demonstrated appropriate incorporation of recommended strategies   Diet/Liquid Recommended Consistencies: Continue age appropriate solids (IDDSI Level 7) and Thin Liquids (IDDSI Level 0) given supervision and STRONG enforcement of safe swallow strategies (see below)  IMPRESSIONS: Lori Díaz, a 4 year old female was referred for a Clinical Swallow Evaluation to assess oral and pharyngeal swallow function as parent reports feeding difficulties including decreased oral intake and chronic coughing prior to MBS scheduled 4/22/2025. Patient presents with mild oral dysphagia for solids secondary to reduced oral motor skills including reduced strength and tone in oral mechanism. Oral stage deficits of solids marked by vertical and rotary chew pattern, prolonged chewing, reduced bolus cohesion, and buccal/lingual residue for regular solids. Given incorporation of safe swallow strategies, oral stage for regular solids within functional limits. Oral stage for thin liquids within functional limits. No overt signs or symptoms of aspiration were demonstrated for Regular Solids or Thin Liquids. Patient noted to have mildly increased breathing during solid and liquid trials. Recommend continuing feeding therapy through [facility] based services to address oral stage skills and increased dietary repertoire. Continue age-appropriate solids (IDDSI Level 7) and Thin Liquids (IDDSI Level 0) given supervision and STRONG enforcement of safe swallow strategies (see below).   Safety precautions swallowing/recommendations:  -Supervision during meals -Take small bites  -Verbal reminders to alternate chewing on both sides  -Ensure complete oral clearance between trials  -Double Swallow or Liquid Wash when oral clearance is insufficient  ADDITIONAL RECOMMENDATIONS:   1. Continue feeding therapy (CPT 54898) through school based services to address aforementioned deficits.   2.Monitor for signs and symptoms of aspiration and/or laryngeal penetration, such as change of breathing pattern, cough, throat clearing, gurgly/wet voice, color change, fever, pneumonia, and upper respiratory infection. If noted: discontinue oral intake and contact physician immediately.   3.Continue to follow-up with all established providers.   This referral process was reviewed with the parent. No further recommendations were made at this time. Please feel free to contact the Center at [phone], if any additional information is needed.   Alvina Rodríguez M.S, CCC-SLP, [facility] #[id number]

## 2025-04-03 NOTE — HISTORY OF PRESENT ILLNESS
[FreeTextEntry6] : HFU R Middle lobe pneumonia on amox, last dose due tomorrow active and playful eating and drinking at baseline   HISTORY OF PRESENT ILLNESS: HIE COVID-19 Result (On Arrival): - COVID-19 Result NEGATIVE - COVID-19 Result Date/Time 15-Mar-2025 10:04 - COVID-19 Test Type MOLECULAR PCR - COVID-19 Ordering Facility [facility]  Julien Influenza Screen: Do you have red irritated eyes or flu-like symptoms (fever, sore throat, cough, headaches, body-aches)? Yes.  Do you work with poultry, waterfowl (like geese and ducks), livestock or in a milk processing plant, or as a  or handle a sick or dead bird(s)? No.  Have you been exposed to a known or suspected person with julien influenza, also known as Bird Flu? No.  International Travel: International Travel within 21 days? No(1).  Preferred Language to Address Healthcare: - Preferred Language to Address Healthcare English  How to be Addressed: - How to be Addressed Lori  Patient Identity: - Birth Sex Female  Child Abuse Assessment (patients less than 13 yrs): BRITT.  Negative Screen.  Chief Complaint: fever.  - Chief Complaint: The patient is a 4y4m Female complaining of - HPI Objective Statement: 4-year-old female no past medical history presenting with cough and fever. Mother reports patient had a runny nose and cough that developed and has had fevers now for the past 72 hours. She reports giving Tylenol and Motrin at 7.5 mL however patient continues to have fevers. Patient was seen and evaluated yesterday by pediatric pulmonology and started on albuterol as needed as well as an inhaled corticosteroid. She denies any vomiting, diarrhea, rash, decreased range of motion of the neck, throat pain.  Med hx denies Surg hx denies Denies medications NKDA Vaccines UTD Social hx non contributory  PAST MEDICAL/SURGICAL/FAMILY/SOCIAL HISTORY: Lead Risk Assessment: - Was lead risk assessment performed within the last year? Yes  ALLERGIES AND HOME MEDICATIONS: Allergies: Allergies: No Known Allergies:  Home Medications: * No Current Medications as of 14-Jan-2025 15:18 documented in Structured Notes  PHYSICAL EXAM: - Physical Examination: Physical exam: Gen: Well developed, NAD; non toxic appearing HEENT: NC/AT, PERRL, no nasal flaring, no nasal congestion, moist mucous membranes CVS: +S1, S2, RRR, no murmurs Lungs: CTA b/l, no retractions/wheezes Abdomen: soft, nontender/nondistended, +BS Ext: no cyanosis/edema, cap refill < 2 seconds Skin: no rashes or skin break down Neuro: Awake/alert, no focal deficit -Exam performed by Rip JIMENEZ  CURRENT ORDERS/: - acetaminophen Oral Liquid - Peds., [Known as TYLENOL Suspension - Peds.] 240 milliGRAM(s), Oral, Once, Stop After 1 Doses Indication: Fever Administration Instructions: Shake well.  Limit acetaminophen dose from all sources and all routes of administration. This is a Look-alike/Sound-alike Medication (Pediatric Calc Info: Override Calculation : (15.89 mG/kG) Override Dose : 240 mG Fixed Dose), 28-Mar-2025, Active, Standard - ibuprofen Oral Liquid - Peds., [Ordered as MOTRIN Oral Liquid - Peds.] 150 milliGRAM(s), Oral, Once, Stop After 1 Doses Indication: Fever Administration Instructions: Shake well. (Pediatric Calc Info: Calculation : (9.93 mG/kG ) Calculated Dose : 150 mG Fixed Dose), 28-Mar-2025, Active, 28-Mar-2025, Standard  RESULTS: Wet Read: There are no Wet Read(s) to document.  PROGRESS NOTE: Date: 28-Mar-2025 07:37.  Progress: Stable. X-ray showing lobar pneumonia. Repeat vital signs within normal limits will treat with amoxicillin, cleared for discharge home Rod Erwin DO PEM Attending.  DISPOSITION: Care Plan - Instructions: Principal Discharge DX: Acute viral syndrome.  Impression: 1.  Principal Discharge Dx Acute viral syndrome.  Medical Decision Making: - Clinical Summary (MDM): Summarize the clinical encounter 4-year-old female presenting with cough and fever in the setting of likely viral syndrome. Patient with some increased work of breathing at home however improved after antipyretic. Mother reports patient has had worsening cough, will evaluate with chest x-ray for community-acquired pneumonia. If pneumonia, will discharge with amoxicillin and treat on outpatient basis. Otherwise of viral, likely supportive care with Tylenol and Motrin  **Elements of this medical decision making may have occurred in a timeline after this above assessment and plan was created. Please refer to progress notes for continued updates in clinical status as well as changes in disposition.**  Rod Erwin DO PEM Attending - Follow-up Instructions (will be supplied to the patient only if discharged) If fever (>100.4) continues, you may give your child EITHER of the following:  Ibuprofen (100mg/5mL): 7.5mL every 6 hours as needed  Tylenol (160mg/5mL): 7.5mL every 4 hours as needed  Pneumonia in Children  Your child was seen today in the Emergency Department and diagnosed with pneumonia. Pneumonia is an infection in one or both lungs. Pneumonia is generally caused by bacteria or viruses. Pneumonia is contagious, meaning germs are spread when an infected person coughs, sneezes, or has close contact with others.  General tips for taking care of a child who has pneumonia: -Medicines: Your child may need any of the following: Antibiotics may be given if your child has a bacterial pneumonia. Antiviral medicine is given to treat an infection caused by a virus but there are very limited antivirals. Influenza can be treated with an antiviral if started within the first 48 hours of infection for some high risk patients. NSAIDs, such as ibuprofen, help decrease swelling, pain, and fever. This medicine can be found over the counter and can be given every 6 hours, follow directions on the box for amount. Do not give these medicines to children under 6 months of age. Acetaminophen decreases pain and fever. This medicine can be found over the counter and can be given every 4 hours, follow directions on the box for amount. Ask your child's healthcare provider before you give your child medicine for his or her cough. We do not recommend any over-the-counter medication for children less than 6 years of age. They have not shown to work and they additionally carry some risk in taking them. Do not give aspirin to children under 18 years of age. Give your child's medicine as directed. Contact your child's healthcare provider if you think the medicine is not working as expected. Tell him or her if your child is allergic to any medicine. Keep a current list of the medicines, vitamins, and herbs your child takes. Include the amounts, and when, how, and why they are taken. Bring the list or the medicines in their containers to follow-up visits. Carry your child's medicine list with you in case of an emergency.  -Let your child rest and sleep as much as possible. Your child may be more tired than usual. Rest and sleep help your child's body heal.  -Help your child breathe easier: Teach your child to take a deep breath and then cough. Have your child do this when he or she feels the need to cough up mucus. This will help get rid of the mucus in the throat and lungs, making it easier for your child to breathe. Clear mucus out of your baby's nose. If your baby has trouble breathing through his or her nose, use a bulb syringe or another device to remove mucus. Clearing the nose before you feed your child or put him or her to bed may be very helpful. Removing the mucus may help your child breathe, eat and sleep better.  Squeeze the bulb and put the tip into one of your baby's nostrils. Close the other nostril with your fingers. Slowly release the bulb to suck up the mucus. You may need to use saline nose drops to loosen the mucus in your baby's nose. Put 3 drops into 1 nostril. Wait for 1 minute so the mucus can loosen. Then use the bulb syringe to remove the mucus and saline. Empty the mucus in the bulb syringe into a tissue. You can use the bulb syringe again if the mucus did not come out. Do this again in the other nostril. The bulb syringe should be boiled in water for 10 minutes when you are done, and then left to dry. This will kill most of the bacteria in the bulb syringe for the next use. After this you should wash your hands. Keep your child's head elevated. If your child is older you can place a pillow under their head. If your child is younger, you can elevate the head of the crib. Do not put pillows in the bed of a child younger than 1 year old. Make sure your child's head does not flop forward. If this happens, your child will not be able to breathe properly.  -How to feed your child when he or she is sick: Bottle feed or breastfeed your child smaller amounts more often. Your child may become tired easily when feeding. Give your child liquids as directed. Avoid dehydration. Give your child plenty of liquids such as water, Pedialyte, Gatorade, apple juice, gelatin, broth, and popsicles. Give your child foods that are easy to digest. Do not be surprised if they have a decreased appetitethat is normal when they are sick. Even if they lose some weight, they will gain it back when they feel better.  Follow up with your pediatrician in 1-2 days to make sure that your child is doing better.  Return to the Emergency Department if: -Your child is younger than 2 months and has a fever. -Your child is having trouble breathing, breathing faster than normal or is wheezing. -Your child's lips or nails are bluish or gray. -Your child is coughing up blood. -Your child's skin between the ribs and around the neck pulls in with each breath. -Your child has any of the following signs of dehydration: Crying without tears, dizziness, dry mouth or cracked lip, more irritable or fussy than normal, sleepier than usual, urinating less than usual (less then 3 times in 24 hours) or not at all and/or sunken soft spot on the top of the head if your child is younger than 1 year.   Disposition: Disposition: DISCHARGE.  You can return to work or school on: 29-Mar-2025.  Patient requests all Lab, Cardiology, and Radiology Results on their Discharge Instructions.  Discharge Disposition: Home.  Discharge Date: 28-Mar-2025.  Condition at Discharge: Improved.  Patient ready for discharge: Patient/Caregiver provided printed discharge information.  You can access the [employer] Patient Portal offered by [facility] by registering at the following website: [email or web address]. By joining [employer] portal, you will also be able to view your health information using other applications (apps) compatible with our system.  Prescriptions: * Outpatient Medication Status not yet specified  ATTESTATION STATEMENT: Attestations Statements: Attending Statement: Attending Only.  PROVIDER CARE INITIATION: - Care Initiated by: Rod Erwin(Attending) - Provider Care Initiated at: 28-Mar-2025 06:04   Electronic Signatures: Rod Erwin) (Signed 28-Mar-2025 07:40) Authored: HISTORY OF PRESENT ILLNESS, PAST MEDICAL/SURGICAL/FAMILY/SOCIAL HISTORY, ALLERGIES AND HOME MEDICATIONS, PHYSICAL EXAM, CURRENT ORDERS/, RESULTS, PROGRESS NOTE, DISPOSITION, ATTESTATION STATEMENT, STROKE, PROVIDER CARE INITIATION   Last Updated: 28-Mar-2025 07:40 by Rod Erwin)  References: 1. Data Referenced From "ED PEDIATRIC Triage Note" 28-Mar-2025 06:01

## 2025-04-03 NOTE — DISCUSSION/SUMMARY
[FreeTextEntry1] : 5 YO here for HFU for Pneumonia ALbuterol and Flovent refilled Advised to continue amox and complete course School clearance provided RTC for WCC/PRN

## 2025-04-22 NOTE — ASSESSMENT
[FreeTextEntry1] : MODIFIED BARIUM SWALLOW STUDY/VIDEOFLUOROSCOPIC SWALLOW STUDY Type of Evaluation & Procedure Code: Motion Flouro Evaluation of Swallow Function CPT code 67261  Patient Name: Lori Díaz Date of Exam: 4/22/2025 YOB: 2020 Referring Physician: Dr. Aaron Ortiz Referring Physician Specialty: Pulmonologist Medical Diagnosis: Unexplained chronic cough (R05.3) Treatment Diagnosis: Oropharyngeal Dysphagia (R13.12) Onset: 9 months ago   REASON FOR REFERRAL:  Lori Díaz, a 4-year-old female, was referred for a Modified Barium Swallow Study to objectively assess oropharyngeal swallow function and rule out silent aspiration due to chronic cough that is now worsening with meals. Patient was accompanied to the evaluation by mother, who provided the case history information, and served as a reliable informant.    PRIMARY LANGUAGE:  Reported Primary Language: Hungarian  A  was utilized during the evaluation to obtain case history, review results and answer questions.   Language Line  number (s): [id number] and [id number]  BIRTH & MEDICAL HISTORY: Birth and Medical history gathered via parent interview and through review of electronic medical record.  Patient born 34 weeks and required oxygen and CPAP at birth due to respiratory distress syndrome and apneic episodes, which resolved. She was weaned off respiratory support by day 2 of life. Her initial cardiac evaluation on day 1 of life was notable for a patent foramen ovale and a large tortuous ductus arteriosus. Patient was discharged on November 12, 2020. Patient is status post cyst drainage inside left side of neck-pyriform sinus ans status post exam of ears under anesthesia with left myringotomy, right wax removal, left pyriform sinus tract cautery, and microdirect laryngoscopy with bronchoscopy 10/13/22. Patient's medical history significant for microcephaly, slow weight gain, developmental delay, significant language delay, mild hearing loss. 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 Patient is followed by Cardiology, Genetics, ENT, GI and pulmonology.   Per most recent pulmonology noted 3/25/2025 "Her chronic dry cough is likely multifactorial, with aspiration being a primary contributor, as symptoms worsen with feeding. A modified barium swallow study (MBSS) is recommended to evaluate for aspiration and assess swallowing mechanics."  Current Respiratory Status: Room Air  Medications: Medication use was reviewed and a list of patient's current medications is available in their chart. Medical allergies: No known Medical allergies reported  Food allergies: No known food allergies reported  Food intolerances: No known food intolerances reported   THERAPEUTIC HISTORY: Reportedly receives physical therapy, occupational therapy and speech therapy through school district.   Results of Previous Modified Barium Swallow Study (MBSS)/Videofluoroscopic Swallow Studies (VFSS):   None reported    Results of Previous Clinical swallow evaluations:   [facility] 4/1/2025: "Diet/Liquid Recommended Consistencies: Continue age appropriate solids (IDDSI Level 7) and Thin Liquids (IDDSI Level 0) given supervision and STRONG enforcement of safe swallow strategies (see below). IMPRESSIONS: Lori Díaz, a 4 year old female was referred for a Clinical Swallow Evaluation to assess oral and pharyngeal swallow function as parent reports feeding difficulties including decreased oral intake and chronic coughing prior to MBS scheduled 4/22/2025. Patient presents with mild oral dysphagia for solids secondary to reduced oral motor skills including reduced strength and tone in oral mechanism. Oral stage deficits of solids marked by vertical and rotary chew pattern, prolonged chewing, reduced bolus cohesion, and buccal/lingual residue for regular solids. Given incorporation of safe swallow strategies, oral stage for regular solids within functional limits. Oral stage for thin liquids within functional limits. No overt signs or symptoms of aspiration were demonstrated for Regular Solids or Thin Liquids. Patient noted to have mildly increased breathing during solid and liquid trials. Recommend continuing feeding therapy through current based services to address oral stage skills and increased dietary repertoire. Continue age-appropriate solids (IDDSI Level 7) and Thin Liquids (IDDSI Level 0) given supervision and STRONG enforcement of safe swallow strategies (see below)."  FEEDING HISTORY: Current Diet (based on the International Dysphagia Diet Standardization initiative [IDDSI]): Exclusive oral diet of age-appropriate solids (IDDSI Level 7) and thin liquids (IDDSI Level 0)  Feeding Method: Some assistance in feeding. Reported Endurance During Meals: Fair  Feeding utensils: Appropriate feeding utensils Feeding schedule: 3 meals per day and snacks with 2-3x per day [employer] protein plus formula Temperature preference: No preference reported Length of time taken to complete a feeding: Over 30 minutes due to distractions and prolonged mastication. History of feeding tube: none reported  Mother reported patient has had a cough for the last 9 months. As of recently the cough has gotten worse with mealtimes and patient reportedly had pneumonia 2 weeks ago.  ASSESSMENT: Mental Status: Alert, responsive, and cooperative Mobility Status: Ambulatory  POSTURAL CONTROL & MOVEMENT PATTERN: Head Control: WFL  Trunk Control: WFL Involuntary movement: Not observed     ORAL MOTOR ASSESSMENT: A cursory oral mechanism examination was conducted.  Patient presented with facial symmetry and closed mouth posture while at rest.  Dentition: Within functional limits for age  Oral Mucosa: Moist Buccal:  Within functional limits  Laryngeal: Strong cough noted at baseline  Palate: Within functional limits   Velar function: intact  Labial: Within functional limits  Lingual: Reduced lingual movements noted during feeding assessment.  Testing Tongue Position: Within functional limits Oral Sensory: Within functional limits  Vocal Quality was perceptually judged to be within functional limits based on vocalizations   MODIFIED BARIUM SWALLOW STUDY (MBSS)/VIDEOFLOUROSCOPIC SWALLOW STUDY (VFSS) ASSESSMENT:The patient was assessed seated upright in a tumble form chair in the [facility] Radiology Suite, with Radiologist present.  Patient's caregiver was present and they did not serve as the feeder during today's exam. Clinician served as feeder  Respiratory Status during Evaluation: Room Air  Patient's volitional cough was noted to be strong Secretion Management: WFL  Baseline dry cough noted prior to PO. No throat clearing and no wet/gurgly vocal quality prior to PO administration. The patient was alert and cooperative.  VFSS/MBS Eval: Solid  Trials: Consistencies Administered:  -Age-appropriate solids (IDDSI Level 7): Oreo  Feeding Method: Self-fed  Positioning: Seated upright in tumble form chair Endurance during meal/trials: Good   Oral Preparatory Stage for Solids: Patient's oral preparatory phase was marked by adequate acceptance to Oreo cookie.  Patient with immature mastication pattern marked by vertical chewing pattern and reduced lingual lateralization though functional for formation of cohesive bolus.   Oral Phase for Solids: Patient's oral phase was marked by mildly reduced and uncoordinated lingual movements with delayed oral transit time/prolonged mastication.  Given verbal cues to "chew," patient demonstrated improvement and was able to prepare, manipulate and clear bolus from oral cavity successfully.   Pharyngeal Phase for Solids:  Pharyngeal stage was marked by  Initiation of the pharyngeal swallow: Prompt  Hyolaryngeal elevation: Mildly reduced  Epiglottic retroflexion: Complete  Pharyngeal transit time: Timely Laryngeal penetration:  Not viewed  Aspiration: Not viewed  Integrity of cricopharyngeal opening: yes  Residue: not viewed   Esophageal Phase:  Backflow not observed Please refer to physician's report with regard to details for esophageal stage of swallow.   VFSS/MBS Eval: Fluid Trials: Consistencies Administered:   -Thin (IDDSI Level 0) via small juice box straw and open cup  -Slightly thick (IDDSI Level 1) via small juice box straw  -Mildly thick (IDDSI Level 2) via small juice box straw -Moderately thick (IDDSI Level 3) via small juice box straw  Feeding Method: Fed by clinician  Positioning: Seated upright in tumble form chair  Endurance during meal/trials: Good   Oral Preparatory Stage for Fluids Patient's oral preparatory phase was marked by adequate acceptance to all consistencies.  Adequate oral grading to open cup and adequate ability to create and maintain intraoral gradient pressure for fluid expression upward in straw for thin and thickened fluids.  Reduced bolus cohesion viewed for thin and slightly thick liquids; improved cohesion appreciated given mildly and moderately thick fluids.   Oral Stage for Fluids:  Patient's oral stage was marked by mildly reduced and uncoordinated lingual movements. Poor oral control viewed for thin and slightly thick liquids as patient was noted with rapid oral transfer despite providing controlled bolus amounts from an open cup.  Incomplete base of tongue to palate contact viewed with uncontrolled spillover to the level of the hypopharynx.  Improved control and containment appreciated given a moderately thick consistency.   Pharyngeal Phase for Fluids:   Pharyngeal stage was marked by  Initiation of the pharyngeal swallow: Mildly delayed  Hyolaryngeal elevation: Mildly reduced Epiglottic retroflexion: Mildly delayed  Pharyngeal transit time: Timely  Laryngeal penetration -Slightly thick (IDDSI Level 1): consistent deep silent penetration viewed during the swallow with spontaneous and complete retrieval. -Mildly thick (IDDSI Level 2): consistent mild silent penetration viewed during the swallow with spontaneous and complete retrieval. -Moderately thick (IDDSI Level 3): not viewed   Aspiration:  -Thin (IDDSI Level 0): consistent trace silent aspiration viewed during the swallow. Patient made no attempt to clear aspirated material from airway.   Integrity of cricopharyngeal opening: yes  Residue: not viewed   Esophageal Phase:  Backflow not observed Please refer to physician's report with regard to details for esophageal stage of swallow.   ROSENBECK'S ASPIRATION-PENETRATION SCALE Aspiration - Penetration Scale    (Nikitabek et al Dysphagia 11:93-98 (April 1996), Aspiration-Penetration Scale) 1.    Material does not enter the airway 2.    Material enters the airway, remains above the vocal folds, and is ejected from the airway 3.    Material enters the airway, remains above the vocal folds, and is not ejected 4.    Material enters the airway, contacts the vocal folds, and is ejected from the airway 5.    Material enters the airway, contacts the vocal folds, and is not ejected from the airway 6.    Material enters the airway, passes below the vocal folds and is ejected into the larynx or out of the airway 7.    Material enters the airway, passes below the vocal folds, and is not ejected from the trachea despite effort 8.    Material enters the airway, passes below the vocal folds, and no effort is made to eject  TRIALS ADMINISTERED AND SEVERITY SCALE:  8-Thin (IDDSI Level 0)  4-Slightly thick (IDDSI Level 1)  2-Mildly thick (IDDSI Level 2)  1-Moderately thick (IDDSI Level 3)   PROGNOSIS:   Prognosis is good with therapeutic intervention and, parent involvement, caregiver involvement, patient involvement.     EDUCATION:   Discussed results of evaluation with mother Mother expressed understanding of evaluation and agreement with goals and treatment plan  Provided written recommendations   Provided written [employer] pamphlet, 's Specifications, [employer] Thickening Guide, [employer] Purchasing information, and [employer] sample packets (~20). Provided moderately thick liquids (IDDSI Level 2) handout Provided [place] feeding/swallowing referral list   Contributing Factors to Swallowing Impairment: Impaired oral-pharyngeal stage Delayed swallow initiation  Impact on safety and functioning: Risk for aspiration:  IMPRESSIONS: Lori Díaz, a 4-year-old female, was referred for a Modified Barium Swallow Study to objectively assess oropharyngeal swallow function and rule out silent aspiration due to chronic cough that is now worsening with meals. Patient presents with moderate oral and severe pharyngeal dysphagia. Oral phase for solids marked by immature mastication pattern, though functional for adequate preparation, manipulation and oral clearance. For fluids, patient with poor control and containment of fluids due to reduced and uncoordinated lingual movements; incomplete base of tongue to palate contact viewed with premature spillage to the level of the hypopharynx for thinner viscosities.  Improved control and containment appreciated given a moderately thick consistency. Pharyngeal phase notable for delayed swallow trigger, reduced hyolaryngeal elevation/excursion, and delayed epiglottic deflection resulting in consistent silent penetration of slightly thick and mildly thick fluids with complete and spontaneous retrieval and trace silent aspiration of thin fluids. Patient made no attempt to eject aspirated material from airway.  Timely pharyngeal transit and clearance appreciated. NO laryngeal penetration/tracheal aspiration viewed for age-appropriate solids and moderately thick liquids. At this time, recommend feeding/swallowing therapy to address oral and pharyngeal deficits.   DIET/FLUID RECOMMENDED CONSISTENCIES: Continue age-appropriate solids (IDDSI Level 7) and initiate moderately thick liquids (IDDSI Level 3)   ADDITIONAL RECOMMENDATIONS: -Initiate/Continue feeding and swallowing therapy (CPT 94399) addressing above mentioned deficits. Services offered at this center, however parents with preference to seek therapeutic intervention closer to home.  -Follow up with ENT given pharyngeal dysphagia -Follow up with pediatrician or gastroenterologist to ensure adequate hydration is met via oral means alone given restricted diet recommendation.  -This patient will require a repeat assessment for a fluid advancement. Recommend repeat Modified Barium Swallow Study/Videofluoroscopic Swallow Study (MBS/VFSS) upon: completion of a course of therapy, oral skills and pharyngeal stage skills have improved (etc) for diet advancement/upgrade.  -Monitor for signs and symptoms of aspiration and or laryngeal penetration, such as change of breathing pattern, cough, throat clearing, gurgly/wet voice, color change, fever, pneumonia, and upper respiratory infection. If noted: Discontinue oral intake, provide non-oral nutrition/hydration/meds, and contact physician immediately.   This referral process was reviewed with the parent.  No further recommendations were made at this time.  Please feel free to contact the Center at [phone], if any additional information is needed.   Alice Cerda M.A. [facility] #[id number]

## 2025-04-29 NOTE — ASSESSMENT
[FreeTextEntry1] : PEDIATRIC CLINICAL SWALLOW EVALUATION  Type of Evaluation & Procedure Code: Evaluation of Oral and Pharyngeal Swallow CPT 08063   Patient Name: Lori Díaz  Date of Exam: 4/25/2025  YOB: 2020  Referring Physician: Dr. Aaron Ortiz  Referring Physician Specialty: Pulmonologist  Medical Diagnosis: Unexplained chronic cough (R05.3)  Treatment Diagnosis: Oropharyngeal Dysphagia (R13.12)  Onset: 9 months ago   REASON FOR REFERRAL:  Lori Díaz, a 4-year-old female, was referred for a follow up Clinical Swallow Evaluation to further educate mother on newly thickened diet and assess patient's acceptance of thickened consistencies given reported refusals to modified diet. Patient was accompanied to the evaluation by mother, who provided the case history information, and served as a reliable informant.   PRIMARY LANGUAGE:  Reported Primary Language:Citizen of [place]   Assessment was completed by a Citizen of [place] Speaking clinician.    BIRTH & MEDICAL HISTORY: Birth and Medical history gathered via parent interview and through review of electronic medical record.  Patient born 34 weeks and required oxygen and CPAP at birth due to respiratory distress syndrome and apneic episodes, which resolved. She was weaned off respiratory support by day 2 of life. Her initial cardiac evaluation on day 1 of life was notable for a patent foramen ovale and a large tortuous ductus arteriosus. Patient was discharged on November 12, 2020. Patient is status post cyst drainage inside left side of neck-pyriform sinus ans status post exam of ears under anesthesia with left myringotomy, right wax removal, left pyriform sinus tract cautery, and microdirect laryngoscopy with bronchoscopy 10/13/22. Patient's medical history significant for microcephaly, slow weight gain, developmental delay, significant language delay, mild hearing loss. 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 Patient is followed by Cardiology, Genetics, ENT, GI and pulmonology.    Per most recent pulmonology noted 3/25/2025 "Her chronic dry cough is likely multifactorial, with aspiration being a primary contributor, as symptoms worsen with feeding. A modified barium swallow study (MBSS) is recommended to evaluate for aspiration and assess swallowing mechanics."   Current Respiratory Status: Room Air   Medications: Medication use was reviewed and a list of patient's current medications is available in their chart.  Medical allergies: No known Medical allergies reported   Food allergies: No known food allergies reported   Food intolerances: No known food intolerances reported    THERAPEUTIC HISTORY:  Reportedly receives physical therapy, occupational therapy and speech therapy through school district.    Results of Previous Modified Barium Swallow Study (MBSS)/Videofluoroscopic Swallow Studies (VFSS):  [facility] [place] 4/22/2025: "Lori Díaz, a 4-year-old female, was referred for a Modified Barium Swallow Study to objectively assess oropharyngeal swallow function and rule out silent aspiration due to chronic cough that is now worsening with meals. Patient presents with moderate oral and severe pharyngeal dysphagia. Oral phase for solids marked by immature mastication pattern, though functional for adequate preparation, manipulation and oral clearance. For fluids, patient with poor control and containment of fluids due to reduced and uncoordinated lingual movements; incomplete base of tongue to palate contact viewed with premature spillage to the level of the hypopharynx for thinner viscosities. Improved control and containment appreciated given a moderately thick consistency. Pharyngeal phase notable for delayed swallow trigger, reduced hyolaryngeal elevation/excursion, and delayed epiglottic deflection resulting in consistent silent penetration of slightly thick and mildly thick fluids with complete and spontaneous retrieval and trace silent aspiration of thin fluids. Patient made no attempt to eject aspirated material from airway. Timely pharyngeal transit and clearance appreciated. NO laryngeal penetration/tracheal aspiration viewed for age-appropriate solids and moderately thick liquids. At this time, recommend feeding/swallowing therapy to address oral and pharyngeal deficits. DIET/FLUID RECOMMENDED CONSISTENCIES: Continue age-appropriate solids (IDDSI Level 7) and initiate moderately thick liquids (IDDSI Level 3)."   Results of Previous Clinical swallow evaluations:  [facility] 4/1/2025: "Diet/Liquid Recommended Consistencies: Continue age appropriate solids (IDDSI Level 7) and Thin Liquids (IDDSI Level 0) given supervision and STRONG enforcement of safe swallow strategies (see below). IMPRESSIONS: Lori Díaz, a 4 year old female was referred for a Clinical Swallow Evaluation to assess oral and pharyngeal swallow function as parent reports feeding difficulties including decreased oral intake and chronic coughing prior to MBS scheduled 4/22/2025. Patient presents with mild oral dysphagia for solids secondary to reduced oral motor skills including reduced strength and tone in oral mechanism. Oral stage deficits of solids marked by vertical and rotary chew pattern, prolonged chewing, reduced bolus cohesion, and buccal/lingual residue for regular solids. Given incorporation of safe swallow strategies, oral stage for regular solids within functional limits. Oral stage for thin liquids within functional limits. No overt signs or symptoms of aspiration were demonstrated for Regular Solids or Thin Liquids. Patient noted to have mildly increased breathing during solid and liquid trials. Recommend continuing feeding therapy through current based services to address oral stage skills and increased dietary repertoire. Continue age-appropriate solids (IDDSI Level 7) and Thin Liquids (IDDSI Level 0) given supervision and STRONG enforcement of safe swallow strategies (see below)."   FEEDING HISTORY:  Current Diet (based on the International Dysphagia Diet Standardization initiative [IDDSI]):  Exclusive oral diet of age-appropriate solids (IDDSI Level 7) and thin liquids (IDDSI Level 0)    Feeding Method: Some assistance in feeding.  Reported Endurance During Meals: Fair   Feeding utensils: Appropriate feeding utensils  Feeding schedule: 3 meals per day and snacks with 2-3x per day [employer] Kids protein plus formula  Temperature preference: No preference reported  Length of time taken to complete a feeding: Over 30 minutes due to distractions and prolonged mastication.  History of feeding tube: none reported   Please note, patient was recommended moderately thick consistency at the time of MBSS dated 4/22/2025, however patient with total refusal and vomiting due to consistency/texture aversions. Mother reported patient has always been a selective eater, and reported patient cried during feeding therapy when she was in Early Intervention.  Mother also stated the school calls her often stating the patient will inconsolably cry when she is forced to eat/drink.    ASSESSMENT:  Mental Status: Alert, responsive, and cooperative  Mobility Status: Ambulatory   POSTURAL CONTROL & MOVEMENT PATTERN:  Head Control: WFL   Trunk Control: WFL  Involuntary movement: Not observed      ORAL MOTOR ASSESSMENT:  A cursory oral mechanism examination of was limited due to patient's age.   Patient presented with facial symmetry and a predominantly closed mouth posture while at rest.  Dentition: Within functional limits for age   Oral Mucosa: Moist  Buccal:  Within functional limits   Laryngeal: Strong cough noted at baseline   Palate: Within functional limits    Velar function: intact   Labial: Within functional limits   Lingual: Mildly reduced and uncoordinated lingual movements.   Testing Tongue Position: Within functional limits  Oral Sensory: Signs of hypersensitivity. Patient required maximum verbal cues/encouragement to drink thickened consistency upon visual observation of thicker liquids.    Vocal Quality was perceptually judged to be within functional limits based on vocalizations. However, please note patient noted with hypernasality during conversational speech.   Gag reflex: Present   ASSESSMENT:  The patient was assessed sitting at a child's table. Patient's caregiver was present, and they served as the feeder during today's exam. Clinician served as feeder   Respiratory Status during Evaluation: Room Air   Secretion Management: WFL  Baseline dry cough noted prior to PO. No throat clearing and no wet/gurgly vocal quality prior to PO administration.  The patient was alert and cooperative, however required maximum verbal encouragement/praise and use of external motivation (i.e., toys) to participate in feeding assessment.    Feeding Assessment:   Solids Trials:  Consistencies Administered:  -Age-appropriate solids (IDDSI Level 7): cheese   Feeding Method: Self-fed  Endurance during meal/trials: Fair   Patient required maximum cue, encouragement/praise to complete testing/evaluation.     Oral Preparatory Stage for Solids:  Patient's oral preparatory phase was marked by fair acceptance. Patient self-fed 3 trials only during evaluation.  Patient placed bolus to molar and noted with an immature mastication pattern marked by reduced lip closure and open mouth posture, vertical munching and reduced lingual lateralization.  Reduced bolus cohesion observed with scattered boluses.    Oral Phase for Solids:  Patient's oral phase was marked by mildly reduced and uncoordinated oromotor movements. Patient noted with prolonged mastication time and decreased anterior posterior movement of bolues. Patient required verbal cues to resume chewing as she was noted with internal and external distractions holding boluses in oral cavity. When cued, patient was able to continue masticating.  Please note, this is consistent with baseline behavior. Therapeutic strategies including small bites and verbal/tactile cues to promote lateralization of bolus provided with good effect as patient was able to successfully clear boluses from oral cavity.   Pharyngeal Stage: Solids   Initiation of swallow based on digital palpation and judged to be: Prompt   Hyo-laryngeal elevation based on digital palpation: Decreased laryngeal elevation  Overt signs and symptoms of penetration/aspiration: Not observed   Fluid Trials:   Consistencies Administered:   -Slightly thick (IDDSI Level 1) via open cup and straw  Patient met the criterion for use of nkf-pharmak [employer] certified organic thickener, and was mixed according to preparation specifications from  for a slightly thick liquid (1 packet to 7-8 ounces)     Feeding Method: Fed by caregiver and clinician   Endurance during meal/trials: Good   Patient required maximum encouragement/praise to complete testing/evaluation.    Positioning: Seated upright in child sized chair    Oral Preparatory Stage for Fluids  Patient's oral preparatory phase was marked by fair acceptance, requiring maximal verbal encouragement and modeling from clinicians to accept fluid. Adequate oral grading to open cup and adequate ability to create and maintain intraoral gradient pressure for fluid expression upward in straw for slightly thick fluids. Adequate bolus cohesion observed for slightly thick.   Oral Stage for Fluids:  Patient's oral stage was marked by mildly reduced and uncoordinated lingual movements. Poor oral control was observed for slightly thick liquids as patient was noted with rapid oral transfer and oral transit time. Given pacing, remediated to adequate AP transfer and oral transit time.   Pharyngeal Phase for Fluids:  Pharyngeal stage was marked by  Initiation of the pharyngeal swallow based on observation: Withinfunctional limits  Hyolaryngeal elevation based on observation: Mildly reduced  NO overt signs and symptoms observed. NO cardiopulmonary changes observed across trials.   PROGNOSIS:  Prognosis is good with therapeutic intervention and, parent involvement, caregiver involvement, patient involvement.   EDUCATION:   Discussed results of evaluation with mother  Mother expressed understanding of evaluation and agreement with goals and treatment plan  Education purpose and necessity of thickened fluids  Education on purpose and necessity of swallowing therapy  Education on safe swallow strategies   Contributing Factors to Swallowing Impairment:  Impaired oral-pharyngeal stage  Delayed swallow initiation   Impact on safety and functioning:  Risk for aspiration  IMPRESSIONS: Lori Díaz, a 4-year-old female, was referred for a follow up Clinical Swallow Evaluation to further educate mother on newly thickened diet and assess patient's acceptance of thickened consistencies given reported refusals to modified diet. Patient presents with moderate oropharyngeal dysphagia secondary to reduced oral motor skills including reduced strength and tone in oral mechanism. Oral stage deficits of solids marked by reduced lip closure and open mouth posture, vertical munching, reduced lingual lateralization, reduced bolus cohesion, prolonged mastication time and decreased anterior posterior movement of bolus. Given incorporation of safe swallow strategies, oral stage for regular solids functional. Oral stage for slightly thick liquids marked by fair acceptance and mildly reduced lingual movements resulting in rapid transfer. Pharyngeal phase based on observation remarkable for reduced hyolaryngeal elevation across viscosities. No overt signs or symptoms of aspiration were demonstrated for Regular Solids or Slightly Thick Liquids. Recommend initiating feeding therapy at [facility] to improve acceptance of current/least restrictive diet and to address oral stage skills. Continue age-appropriate solids (IDDSI Level 7) and initiate Slightly Thick Liquids (IDDSI Level 1) given supervision and STRONG enforcement of safe swallow strategies (see below).   Safety precautions swallowing/recommendations:  -Supervision during meals  -Take small bites  -Verbal reminders to alternate chewing on both sides  -Ensure complete oral clearance between trials  -Double Swallow or Liquid Wash when oral clearance is insufficient   ADDITIONAL RECOMMENDATIONS:  Swallowing/Feeding therapy is recommended: Initiate/Continue feeding and swallowing therapy (CPT 29064) addressing above mentioned deficits. Parents provided with information and contact number to [facility] for short term dysphagia therapy    MBSS/VFSS is recommended: Recommend repeat Modified Barium Swallow Study/Videofluoroscopic Swallow Study (MBS/VFSS) upon: completion of a course of therapy, oral skills have improved, and pharyngeal stage skills have improved (etc) for diet advancement/upgrade.   Monitor for signs and symptoms of aspiration and/or laryngeal penetration, such as change of breathing pattern, cough, throat clearing, gurgly/wet voice, color change, fever, pneumonia, and upper respiratory infection. If noted: discontinue oral intake and contact physician immediately.   Continue to follow-up with all established providers.   TREATMENT PLAN:    Long Term: Patient will tolerate least restrictive diet with out overt signs or symptoms   Parent will carryover feeding strategies.   Short Term:   Patient will improve acceptance of thickened liquids as marked by accepting 8/10 trials.   Patient will tolerate slightly thick liquids via single sips without signs or symptoms of aspiration in 10/10 trials.  Patient will improve pharyngeal swallow initiation via thermal tactile stimulation (~ 1 second) in 10/10 trials   This referral process was reviewed with the parent. No further recommendations were made at this time. Please feel free to contact the Center at [phone], if any additional information is needed.   Alvina Rodríguez M.S, [facility], [facility] #[id number].    References  KAROL Bush., Hal S. O., HELADIO Rodriguez., HELADIO Daneil., [place], S. A., & Roque, M. N. (2014). Impact of children's feeding/swallowing problems: validation of a new caregiver instrument. Dysphagia, 29(6), 671-677. https://doi.org/10.1007/[email or web address]      KIM Stein, JENNIFER Alcaraz, & FUENTES Perez (2008). Age-based norm-reference values for the Child Oral and Motor Proficiency Scale (ChOMPS). Acta Paediatrica. 107(8), 1329-8214. Doi: 10.1111/apa.14578      PARRIS Alcaraz, ROSENDO Stein, FUENTES Perez., KERA Espinal., RACHEL Lance, NEELIMA Merida., KAROL Briceño, and MARCUS Anderson (2014). Development and content validation of the Pediatric Eating Assessment Tool (Pedi-Eat). [place] Journal of Speech-Language pathology, 23, 1-14. Doi: 10:1044/1373-3942 (2013/)

## 2025-04-30 NOTE — ASSESSMENT
[FreeTextEntry1] : 4yoF s/p cyst drainage inside left side of neck-pyriforme sinus.   This child presents with dysphagia and while the primary concern is likely poor muscle tone, coordination, if the symptoms persist after 1 year of life, we can consider a MDLB to evaluate for a laryngeal cleft and possible interarytenoid suture augmentation laryngoplasty vs. Prolaryn injection to augment the interarytenoid area (if response fades, consider interarytenoid suture augmentation laryngoplasty). The child will need follow up with Swallow Therapy for consistent THERAPY and a clinical reevaluation/MBSS after Flonase has helped with nasal congestion but can consider adenoidectomy/wax removal if doing MDLB injection  FU GI, if not taking adequate hydration consider G tube given injection unlikely for full resolution given hypotonia and poor tolerance of thickened diet.   Fu after post slp therapy MBSS.

## 2025-04-30 NOTE — HISTORY OF PRESENT ILLNESS
[de-identified] : 5yo old female s/p cyst drainage inside left side of neck-pyriforme sinus. Repeat MDLB showed no pus and ABR wnl with the exception of chl at one freq. Also with a chronic cough and aspiration concern, in SLp tehrapy and using thickeners. The ABR confirmed normal hearing on the left side, normal hearing on the right side as well except for a mild conductive hearing loss at 4 kHz on the right and repeat WNL. No ear infections or neck swelling. No recent ear infections, signs of pain, otorrhea, concerns for hearing loss She has a history of speech delay and receives speech services in school Vocabulary increasing, can speak about 5 words but slowy No parental concerns with hearing  Eating and drinking well, picky eater. 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

## 2025-04-30 NOTE — PHYSICAL EXAM
[Clear to Auscultation] : lungs were clear to auscultation bilaterally [Normal Gait and Station] : normal gait and station [Normal muscle strength, symmetry and tone of facial, head and neck musculature] : normal muscle strength, symmetry and tone of facial, head and neck musculature [Normal] : no cervical lymphadenopathy [Partial] : partial cerumen impaction [Exposed Vessel] : left anterior vessel not exposed [Wheezing] : no wheezing [Increased Work of Breathing] : no increased work of breathing with use of accessory muscles and retractions [FreeTextEntry9] : CNT

## 2025-04-30 NOTE — PAST MEDICAL HISTORY
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

## 2025-05-16 NOTE — DISCUSSION/SUMMARY
[FreeTextEntry1] : slow weight gain seen by GI drinks ordain has dysphagia uses water and gatorade thickens gets therapy every weekly will work with them for 5 months and reevaluate

## 2025-05-16 NOTE — BEGINNING OF VISIT
[Mother] : mother [] :  [Language Line ] : provided by [employer]   [Time Spent: ____ minutes] : Total time spent using  services: [unfilled] minutes. The patient's primary language is not English thus required  services. [TWNoteComboBox1] : Martiniquais

## 2025-05-16 NOTE — HISTORY OF PRESENT ILLNESS
[de-identified] : slow weight gain [FreeTextEntry6] : slow weight gain seen by GI drinks ordain has dysphagia uses water and gatorade thickens gets therapy every weekly will work with them for 5 months and reevaluate

## 2025-05-16 NOTE — BEGINNING OF VISIT
[Mother] : mother [] :  [Language Line ] : provided by [employer]   [Time Spent: ____ minutes] : Total time spent using  services: [unfilled] minutes. The patient's primary language is not English thus required  services. [TWNoteComboBox1] : [place]

## 2025-05-16 NOTE — HISTORY OF PRESENT ILLNESS
[de-identified] : slow weight gain [FreeTextEntry6] : slow weight gain seen by GI drinks ordain has dysphagia uses water and gatorade thickens gets therapy every weekly will work with them for 5 months and reevaluate

## 2025-05-26 NOTE — REASON FOR VISIT
[Routine Follow-Up] : a routine follow-up visit for [Cough] : cough [Parents] : parents [Medical Records] : medical records

## 2025-05-28 NOTE — HISTORY OF PRESENT ILLNESS
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 - Sick with pneumonia 3/28. Received antibiotics and taking Fluticasone 44mcg 2 puffs BID since then with interval improvement in cough frequency. 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  - Respiratory symptoms when well: chronic dry cough - Bacterial infections/Antibiotics: - Hospitalizations/ER visits: hospitalized -1/15 for dehydration i/s/o URI - Albuterol use: frequent - Steroids use: denies - Reflux/Aspiration symptoms: denies - Vaccination status: UTD - Sleep symptoms: Snores   - Gestational age 34 weeks - Development: delayed - Feeding regimen: PO feeds, mom endorses worsening of cough with food - Medications taking: Albuterol as needed   - [facility] asthma: denies - Allergy symptoms (chronic nasal congestion): + - Eczema: +  ENT: h/o Neck cyst, established with ENT , s/p cyst drainage inside left side of neck-pyriforme sinus. Repeat MDLB showed no pus  CARDS:  variation on coronary artery anatomy which shows a high takeoff of the right coronary artery from the appropriate right sinus of Valsalva. Her aortic root and sinotubular junction have been mildly dilated IMMUNOLOGY: GI: slow wt gain and constipation, followed by GI,  Clinical swallow evaluation 2024 given history of pocketing of food and prolonged mealtimes. Oral stage deficits of solids marked by emerging mastication pattern, overstuffing and prolonged chewing. Functional oral stage of liquids noted. No overt signs or symptoms of aspiration were demonstrated for thin liquids Feeding therapy recommended  Born 34 weeks, Pregnancy c/b GDM and maternal hypothyroidism. LGA oxygen and CPAP at birth Her chest x-ray was consistent with respiratory distress syndrome and she had apneic episodes in the early  period, which resolved. She was weaned off respiratory support by day 2 of life

## 2025-05-28 NOTE — END OF VISIT
[] : Fellow [Time Spent: ___ minutes] : I have spent [unfilled] minutes of time on the encounter which excludes teaching and separately reported services. [FreeTextEntry3] : All the information documented by staff members, review of systems, past medical history, family history, social history, surgical history, medications, allergies, immunizations and vital signs were reviewed. I also reviewed the chart for lab results, radiological images and procedures. I was present with the Fellow during the key portions of the history and exam. I personally saw and examined the patient, talked with the family, and discussed the patient with the fellow. I also discussed the assessment and plan with the family. I modified the fellows note as needed. I agree with the fellows history, physical exam, assessment and plan. I have spent 45 minutes of time on the encounter which excludes teaching for fellow.  Cough is multifactorial - airway reactivity from aspiration syndrome, recurrent URIs with component of postnasal drip - agree with ICS and allergy meds - as stated above, aspiration syndrome and awaiting MBSS even as most recent Speech evaluation did not reveal concerns re. overt aspiration - tracheomalacia as stated in previous note - should patient undergo ENT procedure or CARE procedure, pulmonary should joint for bronchoscopy. Plans were well received by mom.   Ashley De Luna MD Pulmonary & Sleep Medicine

## 2025-05-28 NOTE — ASSESSMENT
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 Her medical history is further complicated by language delay, mild conductive hearing loss, and poor weight gain and sleep disordered breathing.  She presents today for follow-up of a chronic dry cough in the setting of airway reactivity from  recurrent viral illnesses and aspiration syndrome confirmed on modified barium swallow study (MBSS) as well as postnasal drip. Since her last visit, she was treated for pneumonia on 3/28 and started on Fluticasone 44 mcg, 2 puffs twice daily. She has also been undergoing weekly dysphagia therapy, with plans for a repeat MBSS as per ENT. From sleep perspective she continues to have mild snoring without apnea. While there has been some improvement in symptom burden, she continues to experience frequent dry cough.  Recommend stepping up her inhaled corticosteroid to Fluticasone 110 mcg, 2 puffs twice daily, and initiating an intranasal steroid (e.g., Flonase) and/or a non-sedating antihistamine (e.g., Zyrtec or Claritin) to address symptoms consistent with postnasal drip and upper airway cough syndrome. Lori may have underlying tracheomalacia and should she go for sedated procedure in future, pulmonary will join for airway evaluation.   PLAN:  1. Stop Fluticasone Propionate 44mcg 2. Start Fluticasone Propionate 110 mcg (always use spacer with asthma pumps and rinse mouth after use) 3. Fluticasone (Flonase) 1 spray each nostril once a day 4. Indications for albuterol use reviewed 5. If patient undergoes sedated procedure in future pulmonary will join for airway evaluation  6. F/U in 2-3 moths  Discussed above assessment, management plan, potential medication side effects and test results. Parent agreed with plan. All queries were answered. Patients evaluation include normal lung exam and saturation. Time excludes separately reported services.

## 2025-05-28 NOTE — HISTORY OF PRESENT ILLNESS
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 - Sick with pneumonia 3/28. Received antibiotics and taking Fluticasone 44mcg 2 puffs BID since then with interval improvement in cough frequency. 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  - Respiratory symptoms when well: chronic dry cough - Bacterial infections/Antibiotics: - Hospitalizations/ER visits: hospitalized -1/15 for dehydration i/s/o URI - Albuterol use: frequent - Steroids use: denies - Reflux/Aspiration symptoms: denies - Vaccination status: UTD - Sleep symptoms: Snores   - Gestational age 34 weeks - Development: delayed - Feeding regimen: PO feeds, mom endorses worsening of cough with food - Medications taking: Albuterol as needed   - [facility] asthma: denies - Allergy symptoms (chronic nasal congestion): + - Eczema: +  ENT: h/o Neck cyst, established with ENT , s/p cyst drainage inside left side of neck-pyriforme sinus. Repeat MDLB showed no pus  CARDS:  variation on coronary artery anatomy which shows a high takeoff of the right coronary artery from the appropriate right sinus of Valsalva. Her aortic root and sinotubular junction have been mildly dilated IMMUNOLOGY: GI: slow wt gain and constipation, followed by GI,  Clinical swallow evaluation 2024 given history of pocketing of food and prolonged mealtimes. Oral stage deficits of solids marked by emerging mastication pattern, overstuffing and prolonged chewing. Functional oral stage of liquids noted. No overt signs or symptoms of aspiration were demonstrated for thin liquids Feeding therapy recommended  Born 34 weeks, Pregnancy c/b GDM and maternal hypothyroidism. LGA oxygen and CPAP at birth Her chest x-ray was consistent with respiratory distress syndrome and she had apneic episodes in the early  period, which resolved. She was weaned off respiratory support by day 2 of life

## 2025-05-28 NOTE — PHYSICAL EXAM
[Well Nourished] : well nourished [Well Developed] : well developed [Alert] : ~L alert [Active] : active [Normal Breathing Pattern] : normal breathing pattern [No Respiratory Distress] : no respiratory distress [No Allergic Shiners] : no allergic shiners [No Drainage] : no drainage [No Conjunctivitis] : no conjunctivitis [Tympanic Membranes Clear] : tympanic membranes were clear [Nasal Mucosa Non-Edematous] : nasal mucosa non-edematous [No Nasal Drainage] : no nasal drainage [No Polyps] : no polyps [No Sinus Tenderness] : no sinus tenderness [No Oral Pallor] : no oral pallor [No Oral Cyanosis] : no oral cyanosis [Non-Erythematous] : non-erythematous [No Exudates] : no exudates [No Postnasal Drip] : no postnasal drip [No Tonsillar Enlargement] : no tonsillar enlargement [Absence Of Retractions] : absence of retractions [Symmetric] : symmetric [Good Expansion] : good expansion [No Acc Muscle Use] : no accessory muscle use [Good aeration to bases] : good aeration to bases [Equal Breath Sounds] : equal breath sounds bilaterally [No Crackles] : no crackles [No Rhonchi] : no rhonchi [No Wheezing] : no wheezing [Normal Sinus Rhythm] : normal sinus rhythm [No Heart Murmur] : no heart murmur [Soft, Non-Tender] : soft, non-tender [No Hepatosplenomegaly] : no hepatosplenomegaly [Non Distended] : was not ~L distended [Abdomen Mass (___ Cm)] : no abdominal mass palpated [Full ROM] : full range of motion [No Clubbing] : no clubbing [Capillary Refill < 2 secs] : capillary refill less than two seconds [No Cyanosis] : no cyanosis [No Petechiae] : no petechiae [No Kyphoscoliosis] : no kyphoscoliosis [No Contractures] : no contractures [Alert and  Oriented] : alert and oriented [No Abnormal Focal Findings] : no abnormal focal findings [Normal Muscle Tone And Reflexes] : normal muscle tone and reflexes [No Birth Marks] : no birth marks [No Rashes] : no rashes [No Skin Lesions] : no skin lesions [FreeTextEntry4] : Nasal congestion

## 2025-05-28 NOTE — REVIEW OF SYSTEMS
[NI] : Genitourinary  [Nl] : Endocrine [Frequent URIs] : frequent upper respiratory infections [Snoring] : snoring [Cough] : cough [Eczema] : eczema [Apnea] : no apnea [Postnasl Drip] : postnasal drip [Wheezing] : no wheezing [Sleep Disturbances] : ~T no sleep disturbances

## 2025-07-30 NOTE — ASSESSMENT
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 Her medical history is further complicated by language delay, mild conductive hearing loss, and poor weight gain and sleep disordered breathing.  She is followed in [facility] for dysphagia and on thickened liquids. MBSS is scheduled for Sept.   She is using Fluticasone propionate for reactive airway disease from aspiration syndrome and postnasal drip. Note previous diagnoses of pneumonia that  mom states are not always documented by CXRs.  She has been doing well this summer with only occasional cough and mom raised concern about what can potentially happen in colder weather months [place]. with viral triggers as well.  I reviewed then the concept of step up and step down therapy and that an asthma diagnosis will be reviewed. IN the meantime, with the concept of step down this summer, I will keep her at a lower dose of ICS.  Snoring is not a concern at this time per mom.   No persistent nasal congestion as well.   We will await the results of the MBSS.  Mom raised the question of further evaluation if cough recurs and becomes persistent vis a vis results of the MBSS and as discussed in a previous visit, may warrant CARE procedures - triple scope with GI, Pulmonary and ENT.  Tracheomalacia is a theoretical consideration but at this time, given character of cough, triggers and response to albuterol, may be unlikely.   PLAN: 1. Decrease Fluticasone Propionate 110 mcg (always use spacer with asthma pumps and rinse mouth after use) from 2 puffs BID to 2 puffs once a day until next visit.  2.  Indications for albuterol use reviewed 3. Will await MBSS results.  I spent a total of 45 minutes for today's visit in evaluating and treating the patient as per above, including: preparing for patient's appointment (review of prior documents); obtaining and reviewing separately obtained history; performing a medically necessary exam/evaluation; communicating/counseling/educating the patient/family/caregiver; referring to other health care professionals; documenting clinical information in the EHR.

## 2025-07-30 NOTE — HISTORY OF PRESENT ILLNESS
[FreeTextEntry1] : Interval Hx: Followed in [facility] speech with most recent visit 7/16/25: Continue age-appropriate solids (IDDSI Level 7) and initiate Slightly Thick Liquids (IDDSI Level 1) given supervision and STRONG enforcement of safe swallow strategies. Thin Fluids (Level 0) given complete and adequate oral care before and after consumption, with no straws, in between meals, water only. She's scheduled for a Xray in the next month (9/4/25) to evaluate for swallowing.   Pulm Hx: No coughing when swallowing thicken liquids. Patient's cough has improved, she had history of pneumonia in June. She has an intermittent cough in the morning. Has been taking Flovent daily and has taken Albuterol last month when sick. When she was sick with pneumonia, she was wheezing during sleep. This was the only time the wheezing episode occurs. No other events of wheezing had occurred.    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 Her medical history is further complicated by language delay, mild conductive hearing loss, and poor weight gain and sleep disordered breathing. She presents today for follow-up of a chronic dry cough in the setting of airway reactivity from recurrent viral illnesses and aspiration syndrome confirmed on modified barium swallow study (MBSS) as well as postnasal drip. Since her last visit, she was treated for pneumonia on 3/28 and started on Fluticasone 44 mcg, 2 puffs twice daily. She has also been undergoing weekly dysphagia therapy, with plans for a repeat MBSS as per ENT. From sleep perspective she continues to have mild snoring without apnea. While there has been some improvement in symptom burden, she continues to experience frequent dry cough. Recommend stepping up her inhaled corticosteroid to Fluticasone 110 mcg, 2 puffs twice daily, and initiating an intranasal steroid (e.g., Flonase) and/or a non-sedating antihistamine (e.g., Zyrtec or Claritin) to address symptoms consistent with postnasal drip and upper airway cough syndrome. Lori may have underlying tracheomalacia and should she go for sedated procedure in future, pulmonary will join for airway evaluation.  PLAN: 1. Stop Fluticasone Propionate 44mcg 2. Start Fluticasone Propionate 110 mcg (always use spacer with asthma pumps and rinse mouth after use) 3. Fluticasone (Flonase) 1 spray each nostril once a day 4. Indications for albuterol use reviewed 5. If patient undergoes sedated procedure in future pulmonary will join for airway evaluation 6. F/U in 2-3 moths

## 2025-07-30 NOTE — PHYSICAL EXAM
[Well Nourished] : well nourished [Well Developed] : well developed [Alert] : ~L alert [Active] : active [Normal Breathing Pattern] : normal breathing pattern [No Respiratory Distress] : no respiratory distress [No Allergic Shiners] : no allergic shiners [No Drainage] : no drainage [No Conjunctivitis] : no conjunctivitis [Nasal Mucosa Non-Edematous] : nasal mucosa non-edematous [No Nasal Drainage] : no nasal drainage [No Oral Pallor] : no oral pallor [No Oral Cyanosis] : no oral cyanosis [Non-Erythematous] : non-erythematous [No Exudates] : no exudates [No Postnasal Drip] : no postnasal drip [No Tonsillar Enlargement] : no tonsillar enlargement [Absence Of Retractions] : absence of retractions [Symmetric] : symmetric [Good Expansion] : good expansion [No Acc Muscle Use] : no accessory muscle use [Good aeration to bases] : good aeration to bases [Equal Breath Sounds] : equal breath sounds bilaterally [No Crackles] : no crackles [No Rhonchi] : no rhonchi [No Wheezing] : no wheezing [Normal Sinus Rhythm] : normal sinus rhythm [No Heart Murmur] : no heart murmur [Soft, Non-Tender] : soft, non-tender [No Hepatosplenomegaly] : no hepatosplenomegaly [Non Distended] : was not ~L distended [Abdomen Mass (___ Cm)] : no abdominal mass palpated [Full ROM] : full range of motion [No Clubbing] : no clubbing [Capillary Refill < 2 secs] : capillary refill less than two seconds [No Cyanosis] : no cyanosis [No Petechiae] : no petechiae [No Kyphoscoliosis] : no kyphoscoliosis [No Contractures] : no contractures [Alert and  Oriented] : alert and oriented [No Abnormal Focal Findings] : no abnormal focal findings [Normal Muscle Tone And Reflexes] : normal muscle tone and reflexes [No Birth Marks] : no birth marks [No Rashes] : no rashes [No Skin Lesions] : no skin lesions [FreeTextEntry3] : not done

## 2025-07-30 NOTE — REVIEW OF SYSTEMS
[NI] : Genitourinary  [Nl] : Endocrine [Cough] : cough [FreeTextEntry7] : dysphagia, on thickened liquids